# Patient Record
Sex: FEMALE | Race: WHITE | NOT HISPANIC OR LATINO | URBAN - METROPOLITAN AREA
[De-identification: names, ages, dates, MRNs, and addresses within clinical notes are randomized per-mention and may not be internally consistent; named-entity substitution may affect disease eponyms.]

---

## 2022-03-15 ENCOUNTER — INPATIENT (INPATIENT)
Facility: HOSPITAL | Age: 47
LOS: 2 days | Discharge: HOME | End: 2022-03-18
Attending: SPECIALIST | Admitting: SPECIALIST
Payer: MEDICARE

## 2022-03-15 VITALS
DIASTOLIC BLOOD PRESSURE: 82 MMHG | HEIGHT: 63 IN | RESPIRATION RATE: 18 BRPM | SYSTOLIC BLOOD PRESSURE: 143 MMHG | HEART RATE: 90 BPM | WEIGHT: 145.95 LBS | TEMPERATURE: 99 F | OXYGEN SATURATION: 99 %

## 2022-03-15 DIAGNOSIS — Z98.890 OTHER SPECIFIED POSTPROCEDURAL STATES: Chronic | ICD-10-CM

## 2022-03-15 LAB
ALBUMIN SERPL ELPH-MCNC: 4.7 G/DL — SIGNIFICANT CHANGE UP (ref 3.5–5.2)
ALP SERPL-CCNC: 85 U/L — SIGNIFICANT CHANGE UP (ref 30–115)
ALT FLD-CCNC: 20 U/L — SIGNIFICANT CHANGE UP (ref 0–41)
ANION GAP SERPL CALC-SCNC: 12 MMOL/L — SIGNIFICANT CHANGE UP (ref 7–14)
APPEARANCE UR: ABNORMAL
AST SERPL-CCNC: 17 U/L — SIGNIFICANT CHANGE UP (ref 0–41)
BACTERIA # UR AUTO: ABNORMAL
BASOPHILS # BLD AUTO: 0.08 K/UL — SIGNIFICANT CHANGE UP (ref 0–0.2)
BASOPHILS NFR BLD AUTO: 0.4 % — SIGNIFICANT CHANGE UP (ref 0–1)
BILIRUB SERPL-MCNC: 0.3 MG/DL — SIGNIFICANT CHANGE UP (ref 0.2–1.2)
BILIRUB UR-MCNC: NEGATIVE — SIGNIFICANT CHANGE UP
BUN SERPL-MCNC: 18 MG/DL — SIGNIFICANT CHANGE UP (ref 10–20)
CALCIUM SERPL-MCNC: 10 MG/DL — SIGNIFICANT CHANGE UP (ref 8.5–10.1)
CHLORIDE SERPL-SCNC: 101 MMOL/L — SIGNIFICANT CHANGE UP (ref 98–110)
CO2 SERPL-SCNC: 27 MMOL/L — SIGNIFICANT CHANGE UP (ref 17–32)
COLOR SPEC: YELLOW — SIGNIFICANT CHANGE UP
CREAT SERPL-MCNC: 1.1 MG/DL — SIGNIFICANT CHANGE UP (ref 0.7–1.5)
DIFF PNL FLD: ABNORMAL
EGFR: 63 ML/MIN/1.73M2 — SIGNIFICANT CHANGE UP
EOSINOPHIL # BLD AUTO: 0.05 K/UL — SIGNIFICANT CHANGE UP (ref 0–0.7)
EOSINOPHIL NFR BLD AUTO: 0.3 % — SIGNIFICANT CHANGE UP (ref 0–8)
GLUCOSE SERPL-MCNC: 99 MG/DL — SIGNIFICANT CHANGE UP (ref 70–99)
GLUCOSE UR QL: NEGATIVE MG/DL — SIGNIFICANT CHANGE UP
HCG UR QL: NEGATIVE — SIGNIFICANT CHANGE UP
HCT VFR BLD CALC: 38.8 % — SIGNIFICANT CHANGE UP (ref 37–47)
HGB BLD-MCNC: 13.2 G/DL — SIGNIFICANT CHANGE UP (ref 12–16)
IMM GRANULOCYTES NFR BLD AUTO: 1.1 % — HIGH (ref 0.1–0.3)
KETONES UR-MCNC: NEGATIVE — SIGNIFICANT CHANGE UP
LACTATE SERPL-SCNC: 1.1 MMOL/L — SIGNIFICANT CHANGE UP (ref 0.7–2)
LEUKOCYTE ESTERASE UR-ACNC: ABNORMAL
LIDOCAIN IGE QN: 97 U/L — HIGH (ref 7–60)
LYMPHOCYTES # BLD AUTO: 1.29 K/UL — SIGNIFICANT CHANGE UP (ref 1.2–3.4)
LYMPHOCYTES # BLD AUTO: 6.5 % — LOW (ref 20.5–51.1)
MCHC RBC-ENTMCNC: 29.7 PG — SIGNIFICANT CHANGE UP (ref 27–31)
MCHC RBC-ENTMCNC: 34 G/DL — SIGNIFICANT CHANGE UP (ref 32–37)
MCV RBC AUTO: 87.4 FL — SIGNIFICANT CHANGE UP (ref 81–99)
MONOCYTES # BLD AUTO: 0.96 K/UL — HIGH (ref 0.1–0.6)
MONOCYTES NFR BLD AUTO: 4.8 % — SIGNIFICANT CHANGE UP (ref 1.7–9.3)
NEUTROPHILS # BLD AUTO: 17.25 K/UL — HIGH (ref 1.4–6.5)
NEUTROPHILS NFR BLD AUTO: 86.9 % — HIGH (ref 42.2–75.2)
NITRITE UR-MCNC: POSITIVE
NRBC # BLD: 0 /100 WBCS — SIGNIFICANT CHANGE UP (ref 0–0)
PH UR: 7 — SIGNIFICANT CHANGE UP (ref 5–8)
PLATELET # BLD AUTO: 324 K/UL — SIGNIFICANT CHANGE UP (ref 130–400)
POTASSIUM SERPL-MCNC: 4.6 MMOL/L — SIGNIFICANT CHANGE UP (ref 3.5–5)
POTASSIUM SERPL-SCNC: 4.6 MMOL/L — SIGNIFICANT CHANGE UP (ref 3.5–5)
PROT SERPL-MCNC: 7.1 G/DL — SIGNIFICANT CHANGE UP (ref 6–8)
PROT UR-MCNC: >=300 MG/DL
RBC # BLD: 4.44 M/UL — SIGNIFICANT CHANGE UP (ref 4.2–5.4)
RBC # FLD: 12.1 % — SIGNIFICANT CHANGE UP (ref 11.5–14.5)
RBC CASTS # UR COMP ASSIST: >50 /HPF
SARS-COV-2 RNA SPEC QL NAA+PROBE: SIGNIFICANT CHANGE UP
SODIUM SERPL-SCNC: 140 MMOL/L — SIGNIFICANT CHANGE UP (ref 135–146)
SP GR SPEC: >=1.03 (ref 1.01–1.03)
UROBILINOGEN FLD QL: 0.2 MG/DL — SIGNIFICANT CHANGE UP
WBC # BLD: 19.84 K/UL — HIGH (ref 4.8–10.8)
WBC # FLD AUTO: 19.84 K/UL — HIGH (ref 4.8–10.8)
WBC UR QL: >50 /HPF

## 2022-03-15 PROCEDURE — 99285 EMERGENCY DEPT VISIT HI MDM: CPT | Mod: FS

## 2022-03-15 PROCEDURE — 74177 CT ABD & PELVIS W/CONTRAST: CPT | Mod: 26,MA

## 2022-03-15 PROCEDURE — 99221 1ST HOSP IP/OBS SF/LOW 40: CPT

## 2022-03-15 RX ORDER — ZINC SULFATE TAB 220 MG (50 MG ZINC EQUIVALENT) 220 (50 ZN) MG
0 TAB ORAL
Qty: 0 | Refills: 0 | DISCHARGE

## 2022-03-15 RX ORDER — CLOPIDOGREL BISULFATE 75 MG/1
75 TABLET, FILM COATED ORAL DAILY
Refills: 0 | Status: DISCONTINUED | OUTPATIENT
Start: 2022-03-15 | End: 2022-03-15

## 2022-03-15 RX ORDER — L.ACIDOPH/B.ANIMALIS/B.LONGUM 15B CELL
1 CAPSULE ORAL
Qty: 0 | Refills: 0 | DISCHARGE

## 2022-03-15 RX ORDER — ASPIRIN/CALCIUM CARB/MAGNESIUM 324 MG
0 TABLET ORAL
Qty: 0 | Refills: 0 | DISCHARGE

## 2022-03-15 RX ORDER — CLOPIDOGREL BISULFATE 75 MG/1
1 TABLET, FILM COATED ORAL
Qty: 0 | Refills: 0 | DISCHARGE

## 2022-03-15 RX ORDER — PHENAZOPYRIDINE HCL 100 MG
100 TABLET ORAL ONCE
Refills: 0 | Status: COMPLETED | OUTPATIENT
Start: 2022-03-15 | End: 2022-03-15

## 2022-03-15 RX ORDER — CEFEPIME 1 G/1
1000 INJECTION, POWDER, FOR SOLUTION INTRAMUSCULAR; INTRAVENOUS ONCE
Refills: 0 | Status: COMPLETED | OUTPATIENT
Start: 2022-03-15 | End: 2022-03-15

## 2022-03-15 RX ORDER — CEFTRIAXONE 500 MG/1
1000 INJECTION, POWDER, FOR SOLUTION INTRAMUSCULAR; INTRAVENOUS EVERY 24 HOURS
Refills: 0 | Status: DISCONTINUED | OUTPATIENT
Start: 2022-03-15 | End: 2022-03-15

## 2022-03-15 RX ORDER — CEFEPIME 1 G/1
1000 INJECTION, POWDER, FOR SOLUTION INTRAMUSCULAR; INTRAVENOUS EVERY 8 HOURS
Refills: 0 | Status: DISCONTINUED | OUTPATIENT
Start: 2022-03-15 | End: 2022-03-16

## 2022-03-15 RX ORDER — SODIUM CHLORIDE 9 MG/ML
1000 INJECTION INTRAMUSCULAR; INTRAVENOUS; SUBCUTANEOUS
Refills: 0 | Status: DISCONTINUED | OUTPATIENT
Start: 2022-03-15 | End: 2022-03-16

## 2022-03-15 RX ORDER — CHLORHEXIDINE GLUCONATE 213 G/1000ML
1 SOLUTION TOPICAL
Refills: 0 | Status: DISCONTINUED | OUTPATIENT
Start: 2022-03-15 | End: 2022-03-15

## 2022-03-15 RX ORDER — HYDROMORPHONE HYDROCHLORIDE 2 MG/ML
0.5 INJECTION INTRAMUSCULAR; INTRAVENOUS; SUBCUTANEOUS
Refills: 0 | Status: DISCONTINUED | OUTPATIENT
Start: 2022-03-15 | End: 2022-03-16

## 2022-03-15 RX ORDER — SODIUM CHLORIDE 9 MG/ML
1000 INJECTION, SOLUTION INTRAVENOUS
Refills: 0 | Status: DISCONTINUED | OUTPATIENT
Start: 2022-03-15 | End: 2022-03-16

## 2022-03-15 RX ORDER — SIMVASTATIN 20 MG/1
20 TABLET, FILM COATED ORAL AT BEDTIME
Refills: 0 | Status: DISCONTINUED | OUTPATIENT
Start: 2022-03-15 | End: 2022-03-15

## 2022-03-15 RX ORDER — ASPIRIN/CALCIUM CARB/MAGNESIUM 324 MG
325 TABLET ORAL DAILY
Refills: 0 | Status: DISCONTINUED | OUTPATIENT
Start: 2022-03-15 | End: 2022-03-15

## 2022-03-15 RX ORDER — MORPHINE SULFATE 50 MG/1
2 CAPSULE, EXTENDED RELEASE ORAL EVERY 4 HOURS
Refills: 0 | Status: DISCONTINUED | OUTPATIENT
Start: 2022-03-15 | End: 2022-03-16

## 2022-03-15 RX ORDER — SODIUM CHLORIDE 9 MG/ML
1000 INJECTION, SOLUTION INTRAVENOUS ONCE
Refills: 0 | Status: COMPLETED | OUTPATIENT
Start: 2022-03-15 | End: 2022-03-15

## 2022-03-15 RX ORDER — SODIUM CHLORIDE 9 MG/ML
1000 INJECTION, SOLUTION INTRAVENOUS
Refills: 0 | Status: DISCONTINUED | OUTPATIENT
Start: 2022-03-15 | End: 2022-03-15

## 2022-03-15 RX ORDER — SIMVASTATIN 20 MG/1
1 TABLET, FILM COATED ORAL
Qty: 0 | Refills: 0 | DISCHARGE

## 2022-03-15 RX ADMIN — SODIUM CHLORIDE 1000 MILLILITER(S): 9 INJECTION, SOLUTION INTRAVENOUS at 16:18

## 2022-03-15 RX ADMIN — Medication 100 MILLIGRAM(S): at 22:44

## 2022-03-15 RX ADMIN — CEFEPIME 100 MILLIGRAM(S): 1 INJECTION, POWDER, FOR SOLUTION INTRAMUSCULAR; INTRAVENOUS at 17:45

## 2022-03-15 NOTE — ED ADULT NURSE NOTE - NSIMPLEMENTINTERV_GEN_ALL_ED
No Implemented All Universal Safety Interventions:  [place] to call system. Call bell, personal items and telephone within reach. Instruct patient to call for assistance. Room bathroom lighting operational. Non-slip footwear when patient is off stretcher. Physically safe environment: no spills, clutter or unnecessary equipment. Stretcher in lowest position, wheels locked, appropriate side rails in place.

## 2022-03-15 NOTE — PRE-ANESTHESIA EVALUATION ADULT - NSANTHPMHFT_GEN_ALL_CORE
47 YO f WITH PMHX OF CVA 13 years ago due to OCP'S required crainotomy on ASA/ PLAVIX/ HLD on statin , p/w left flank pain x 1 day 10/10 sharp non-radiating.    # LEFT RENAL COLIC WITH ELEVATED WBC AND LOW GRADE TEMP   0.7 x 0.5 x 0.9 cm   distal left ureteral calculus

## 2022-03-15 NOTE — H&P ADULT - NSHPPHYSICALEXAM_GEN_ALL_CORE
Vital Signs Last 24 Hrs  T(C): 37.5 (15 Mar 2022 17:52), Max: 37.5 (15 Mar 2022 17:52)  T(F): 99.5 (15 Mar 2022 17:52), Max: 99.5 (15 Mar 2022 17:52)  HR: 80 (15 Mar 2022 17:52) (80 - 90)  BP: 134/80 (15 Mar 2022 17:52) (134/80 - 143/82)  RR: 18 (15 Mar 2022 17:52) (18 - 18)  SpO2: 99% (15 Mar 2022 17:52) (99% - 99%)      GENERAL:  45y/o Female NAD, resting comfortably.  HEAD:  Atraumatic, right craniotomy   EYES: EOMI, PERRLA, conjunctiva and sclera clear  NECK: Supple, No JVD, no cervical lymphadenopathy, non-tender  CHEST/LUNG: Clear to auscultation bilaterally; No wheeze, rhonchi, or rales  HEART: Regular rate and rhythm; S1&S2  ABDOMEN: Soft, Nontender, Nondistended x 4 quadrants; Bowel sounds present + CVAT left side  EXTREMITIES:   Peripheral Pulses Present, No clubbing, no cyanosis, or no edema, no calf tenderness, LUE weakness drom   PSYCH: AAOx3, cooperative, appropriate  NEUROLOGY: WNL

## 2022-03-15 NOTE — ED PROVIDER NOTE - NS ED ATTENDING STATEMENT MOD
This was a shared visit with the MERON. I reviewed and verified the documentation and independently performed the documented:

## 2022-03-15 NOTE — H&P ADULT - HISTORY OF PRESENT ILLNESS
47 YO f WITH PMHX OF CVA 13 years ago due to OCP'S required crainotomy on ASA/ PLAVIX/ HLD on statin , p/w left flank pain x 1 day 10/10 sharp non-radiating.  pt states visiting PMD- dr. jones last week and being treated for UTI but pain arised today and was sent to ER to eval for stone.  Pt vomited in PMD office. + fever in ER 99.5   denies sob/ cp / chills / rigors   denies frequent UTI   does not have a urologist     ER: IVF / IVABX

## 2022-03-15 NOTE — H&P ADULT - ASSESSMENT
45 YO f WITH PMHX OF CVA 13 years ago due to OCP'S required crainotomy on ASA/ PLAVIX/ HLD on statin , p/w left flank pain x 1 day 10/10 sharp non-radiating.    # LEFT RENAL COLIC WITH ELEVATED WBC AND LOW GRADE TEMP   0.7 x 0.5 x 0.9 cm   distal left ureteral calculus    - NPO   - OR FOR STENT PLACEMENT   - IV ABX   - PAIN MEDS   - IVF   - LABS IN AM     D/W DR. NUNES

## 2022-03-15 NOTE — ED PROVIDER NOTE - ATTENDING CONTRIBUTION TO CARE
46y female above PMH with persistent dysuria now with left flank pain and subjective fevers, on exam vital signs appreciated, well appearing, abd snt, +left cvat, labs and studies reviewed, will admit to urology for iv abx, intervention, stable for floor

## 2022-03-15 NOTE — ED PROVIDER NOTE - NS ED ROS FT
CONST: No fever, chills or bodyaches  EYES: No pain, redness, drainage or visual changes.  ENT: No ear pain or discharge, nasal discharge or congestion. No sore throat  CARD: No chest pain, palpitations  RESP: No SOB, cough, hemoptysis. No hx of asthma or COPD  GI: No abdominal pain, N/V/D  : (+) dysuria, urinary frequency, and left flank pain.   MS: No joint pain, back pain or extremity pain/injury  SKIN: No rashes  NEURO: No headache, dizziness, paresthesias or LOC

## 2022-03-15 NOTE — ED ADULT TRIAGE NOTE - CHIEF COMPLAINT QUOTE
Pt states she just finished a course of abx for a UTI, went in to see Dr. Telles today because she was still in pain. Vomited in office, Koki sent pt in to r/o kidney stone.

## 2022-03-15 NOTE — ED PROVIDER NOTE - PHYSICAL EXAMINATION
Physical Exam    Vital Signs: I have reviewed the initial vital signs.  Constitutional: well-nourished, appears stated age, no acute distress  Eyes: Conjunctiva pink, Sclera clear  Cardiovascular: S1 and S2, regular rate, regular rhythm, well-perfused extremities, radial pulses equal and 2+ b/l.   Respiratory: unlabored respiratory effort, clear to auscultation bilaterally no wheezing, rales and rhonchi. pt is speaking full sentences. no accessory muscle use.   Gastrointestinal: soft, non-tender, nondistended abdomen, no pulsatile mass, normal bowl sounds, no rebound, no guarding, no cva tenderness.    Musculoskeletal: supple neck, no lower extremity edema, no calf tenderness. (+) left arm is contracted as residual from past cva.   Integumentary: warm, dry, no rash  Neurologic: (+) right frontal scalp deformity due to past craniotomy. awake, alert, cranial nerves II-XII grossly intact, extremities’ motor and sensory functions grossly intact.   Psychiatric: appropriate mood, appropriate affect

## 2022-03-15 NOTE — H&P ADULT - NSHPLABSRESULTS_GEN_ALL_CORE
03-15    140  |  101  |  18  ----------------------------<  99  4.6   |  27  |  1.1    Ca    10.0      15 Mar 2022 16:20    TPro  7.1  /  Alb  4.7  /  TBili  0.3  /  DBili  x   /  AST  17  /  ALT  20  /  AlkPhos  85  03-15                            13.2   19.84 )-----------( 324      ( 15 Mar 2022 16:20 )             38.8     < from: CT Abdomen and Pelvis w/ IV Cont (03.15.22 @ 17:21) >    IMPRESSION:  1.  Moderate left hydroureteronephrosis secondary to a 0.7 x 0.5 x 0.9 cm   distal left ureteral calculus (); diffuse left-sided urothelial   thickening/hyperenhancement and surrounding inflammation. Findings may   represent superimposed ascending urinary tract infection. Correlate with   urinalysis and other laboratory findings.    2.  Right UPJ/proximal ureteral 0.8 x 0.5 x 0.9 cm calculus without   associated hydronephrosis.    < end of copied text >

## 2022-03-15 NOTE — PROGRESS NOTE ADULT - SUBJECTIVE AND OBJECTIVE BOX
I have been notified by the Interventional Radiology team that the pt will have Left PCN performed first case in morning.  Dr Saul aware.  As a result we are requesting admission to the ICU for sepsis and to have close hemodynamic monitoring.   The SICU Attending has approved the admission and pt will be transfered directly from the [facility] PACU to the SICU. In the event that pt becomes unstable the IR team will emergently perform the procedure

## 2022-03-15 NOTE — H&P ADULT - NSHPADDITIONALINFOADULT_GEN_ALL_CORE
I offered the patient cystoscopy bilateral ureteral stent placement.  Side effects were discussed.  She will need definitive treatment of kidney stones after UTI is resolved.  Risk of bleeding, infection, urgency, incontinence were discussed.

## 2022-03-15 NOTE — BRIEF OPERATIVE NOTE - OPERATION/FINDINGS
Cystoscopy Right Ureteral Stent placement (pyonephrosis seen)  Cystoscopy Attempted left Ureteral Stent Placement (significant pyonephrosis seen upon placement of guidewire past the stone.  I could not advance the guidewire in the renal pelvis however.

## 2022-03-15 NOTE — ED ADULT NURSE REASSESSMENT NOTE - NS ED NURSE REASSESS COMMENT FT1
pt received from previous rn, pt aaox3, resps even and unlabored. report given to OR. pt undressed, belongings given to patients . pre op checklist started pending transport to OR

## 2022-03-15 NOTE — ED PROVIDER NOTE - OBJECTIVE STATEMENT
45 y/o female with a PMH of CVA (induced by birth control about 8 years ago, s/p right craniotomy, on plavix, aspirin, and statin) presents to the ED for evaluation of left flank pain that began earlier today. pt reports dysuria, and urinary frequency. pt reports she was seen by her PCP Dr. Telles after finishing course of bactrim, and macrobid and was advised to visit the ED for further evaluation. pt mother has had multiple kidney stones. pt is currently on her menses. pt reports one episode of nbnb emesis today. pt denies hx of kidney stones, rashes, chest pain, sob, recent trauma, weakness, numbness, tingling, hx of abdominal surgeries.

## 2022-03-16 LAB
A1C WITH ESTIMATED AVERAGE GLUCOSE RESULT: 5 % — SIGNIFICANT CHANGE UP (ref 4–5.6)
ANION GAP SERPL CALC-SCNC: 10 MMOL/L — SIGNIFICANT CHANGE UP (ref 7–14)
ANION GAP SERPL CALC-SCNC: 11 MMOL/L — SIGNIFICANT CHANGE UP (ref 7–14)
APTT BLD: 31 SEC — SIGNIFICANT CHANGE UP (ref 27–39.2)
BUN SERPL-MCNC: 12 MG/DL — SIGNIFICANT CHANGE UP (ref 10–20)
BUN SERPL-MCNC: 13 MG/DL — SIGNIFICANT CHANGE UP (ref 10–20)
CALCIUM SERPL-MCNC: 8.5 MG/DL — SIGNIFICANT CHANGE UP (ref 8.5–10.1)
CALCIUM SERPL-MCNC: 8.7 MG/DL — SIGNIFICANT CHANGE UP (ref 8.5–10.1)
CHLORIDE SERPL-SCNC: 101 MMOL/L — SIGNIFICANT CHANGE UP (ref 98–110)
CHLORIDE SERPL-SCNC: 101 MMOL/L — SIGNIFICANT CHANGE UP (ref 98–110)
CO2 SERPL-SCNC: 25 MMOL/L — SIGNIFICANT CHANGE UP (ref 17–32)
CO2 SERPL-SCNC: 25 MMOL/L — SIGNIFICANT CHANGE UP (ref 17–32)
CREAT SERPL-MCNC: 0.9 MG/DL — SIGNIFICANT CHANGE UP (ref 0.7–1.5)
CREAT SERPL-MCNC: 1.1 MG/DL — SIGNIFICANT CHANGE UP (ref 0.7–1.5)
E COLI DNA BLD POS QL NAA+NON-PROBE: SIGNIFICANT CHANGE UP
EGFR: 63 ML/MIN/1.73M2 — SIGNIFICANT CHANGE UP
EGFR: 80 ML/MIN/1.73M2 — SIGNIFICANT CHANGE UP
ESTIMATED AVERAGE GLUCOSE: 97 MG/DL — SIGNIFICANT CHANGE UP (ref 68–114)
GLUCOSE SERPL-MCNC: 115 MG/DL — HIGH (ref 70–99)
GLUCOSE SERPL-MCNC: 153 MG/DL — HIGH (ref 70–99)
GRAM STN FLD: SIGNIFICANT CHANGE UP
GRAM STN FLD: SIGNIFICANT CHANGE UP
HCT VFR BLD CALC: 34.2 % — LOW (ref 37–47)
HCT VFR BLD CALC: 35.6 % — LOW (ref 37–47)
HGB BLD-MCNC: 11.2 G/DL — LOW (ref 12–16)
HGB BLD-MCNC: 12 G/DL — SIGNIFICANT CHANGE UP (ref 12–16)
INR BLD: 1.16 RATIO — SIGNIFICANT CHANGE UP (ref 0.65–1.3)
MAGNESIUM SERPL-MCNC: 1.8 MG/DL — SIGNIFICANT CHANGE UP (ref 1.8–2.4)
MAGNESIUM SERPL-MCNC: 2 MG/DL — SIGNIFICANT CHANGE UP (ref 1.8–2.4)
MCHC RBC-ENTMCNC: 29.5 PG — SIGNIFICANT CHANGE UP (ref 27–31)
MCHC RBC-ENTMCNC: 29.6 PG — SIGNIFICANT CHANGE UP (ref 27–31)
MCHC RBC-ENTMCNC: 32.7 G/DL — SIGNIFICANT CHANGE UP (ref 32–37)
MCHC RBC-ENTMCNC: 33.7 G/DL — SIGNIFICANT CHANGE UP (ref 32–37)
MCV RBC AUTO: 87.9 FL — SIGNIFICANT CHANGE UP (ref 81–99)
MCV RBC AUTO: 90 FL — SIGNIFICANT CHANGE UP (ref 81–99)
METHOD TYPE: SIGNIFICANT CHANGE UP
NRBC # BLD: 0 /100 WBCS — SIGNIFICANT CHANGE UP (ref 0–0)
NRBC # BLD: 0 /100 WBCS — SIGNIFICANT CHANGE UP (ref 0–0)
PHOSPHATE SERPL-MCNC: 2.6 MG/DL — SIGNIFICANT CHANGE UP (ref 2.1–4.9)
PHOSPHATE SERPL-MCNC: 3.6 MG/DL — SIGNIFICANT CHANGE UP (ref 2.1–4.9)
PLATELET # BLD AUTO: 302 K/UL — SIGNIFICANT CHANGE UP (ref 130–400)
PLATELET # BLD AUTO: 334 K/UL — SIGNIFICANT CHANGE UP (ref 130–400)
POTASSIUM SERPL-MCNC: 4.5 MMOL/L — SIGNIFICANT CHANGE UP (ref 3.5–5)
POTASSIUM SERPL-MCNC: 4.8 MMOL/L — SIGNIFICANT CHANGE UP (ref 3.5–5)
POTASSIUM SERPL-SCNC: 4.5 MMOL/L — SIGNIFICANT CHANGE UP (ref 3.5–5)
POTASSIUM SERPL-SCNC: 4.8 MMOL/L — SIGNIFICANT CHANGE UP (ref 3.5–5)
PROTHROM AB SERPL-ACNC: 13.3 SEC — HIGH (ref 9.95–12.87)
RBC # BLD: 3.8 M/UL — LOW (ref 4.2–5.4)
RBC # BLD: 4.05 M/UL — LOW (ref 4.2–5.4)
RBC # FLD: 12.3 % — SIGNIFICANT CHANGE UP (ref 11.5–14.5)
RBC # FLD: 12.7 % — SIGNIFICANT CHANGE UP (ref 11.5–14.5)
SODIUM SERPL-SCNC: 136 MMOL/L — SIGNIFICANT CHANGE UP (ref 135–146)
SODIUM SERPL-SCNC: 137 MMOL/L — SIGNIFICANT CHANGE UP (ref 135–146)
SPECIMEN SOURCE: SIGNIFICANT CHANGE UP
SPECIMEN SOURCE: SIGNIFICANT CHANGE UP
WBC # BLD: 21.7 K/UL — HIGH (ref 4.8–10.8)
WBC # BLD: 25.78 K/UL — HIGH (ref 4.8–10.8)
WBC # FLD AUTO: 21.7 K/UL — HIGH (ref 4.8–10.8)
WBC # FLD AUTO: 25.78 K/UL — HIGH (ref 4.8–10.8)

## 2022-03-16 PROCEDURE — 99291 CRITICAL CARE FIRST HOUR: CPT

## 2022-03-16 PROCEDURE — 50432 PLMT NEPHROSTOMY CATHETER: CPT | Mod: LT

## 2022-03-16 PROCEDURE — 93010 ELECTROCARDIOGRAM REPORT: CPT

## 2022-03-16 RX ORDER — SIMVASTATIN 20 MG/1
20 TABLET, FILM COATED ORAL AT BEDTIME
Refills: 0 | Status: DISCONTINUED | OUTPATIENT
Start: 2022-03-16 | End: 2022-03-18

## 2022-03-16 RX ORDER — INFLUENZA VIRUS VACCINE 15; 15; 15; 15 UG/.5ML; UG/.5ML; UG/.5ML; UG/.5ML
0.5 SUSPENSION INTRAMUSCULAR ONCE
Refills: 0 | Status: DISCONTINUED | OUTPATIENT
Start: 2022-03-16 | End: 2022-03-18

## 2022-03-16 RX ORDER — CEFTRIAXONE 500 MG/1
2000 INJECTION, POWDER, FOR SOLUTION INTRAMUSCULAR; INTRAVENOUS EVERY 12 HOURS
Refills: 0 | Status: DISCONTINUED | OUTPATIENT
Start: 2022-03-16 | End: 2022-03-18

## 2022-03-16 RX ORDER — SENNA PLUS 8.6 MG/1
2 TABLET ORAL AT BEDTIME
Refills: 0 | Status: DISCONTINUED | OUTPATIENT
Start: 2022-03-16 | End: 2022-03-18

## 2022-03-16 RX ORDER — SODIUM CHLORIDE 9 MG/ML
1000 INJECTION, SOLUTION INTRAVENOUS
Refills: 0 | Status: DISCONTINUED | OUTPATIENT
Start: 2022-03-16 | End: 2022-03-18

## 2022-03-16 RX ORDER — PANTOPRAZOLE SODIUM 20 MG/1
40 TABLET, DELAYED RELEASE ORAL
Refills: 0 | Status: DISCONTINUED | OUTPATIENT
Start: 2022-03-16 | End: 2022-03-18

## 2022-03-16 RX ORDER — ENOXAPARIN SODIUM 100 MG/ML
40 INJECTION SUBCUTANEOUS EVERY 24 HOURS
Refills: 0 | Status: DISCONTINUED | OUTPATIENT
Start: 2022-03-16 | End: 2022-03-18

## 2022-03-16 RX ORDER — OXYCODONE HYDROCHLORIDE 5 MG/1
5 TABLET ORAL EVERY 6 HOURS
Refills: 0 | Status: DISCONTINUED | OUTPATIENT
Start: 2022-03-16 | End: 2022-03-18

## 2022-03-16 RX ORDER — ACETAMINOPHEN 500 MG
650 TABLET ORAL EVERY 6 HOURS
Refills: 0 | Status: DISCONTINUED | OUTPATIENT
Start: 2022-03-16 | End: 2022-03-18

## 2022-03-16 RX ADMIN — OXYCODONE HYDROCHLORIDE 5 MILLIGRAM(S): 5 TABLET ORAL at 12:00

## 2022-03-16 RX ADMIN — CEFEPIME 100 MILLIGRAM(S): 1 INJECTION, POWDER, FOR SOLUTION INTRAMUSCULAR; INTRAVENOUS at 05:19

## 2022-03-16 RX ADMIN — Medication 650 MILLIGRAM(S): at 21:53

## 2022-03-16 RX ADMIN — OXYCODONE HYDROCHLORIDE 5 MILLIGRAM(S): 5 TABLET ORAL at 17:27

## 2022-03-16 RX ADMIN — OXYCODONE HYDROCHLORIDE 5 MILLIGRAM(S): 5 TABLET ORAL at 11:24

## 2022-03-16 RX ADMIN — Medication 650 MILLIGRAM(S): at 03:47

## 2022-03-16 RX ADMIN — OXYCODONE HYDROCHLORIDE 5 MILLIGRAM(S): 5 TABLET ORAL at 04:17

## 2022-03-16 RX ADMIN — SENNA PLUS 2 TABLET(S): 8.6 TABLET ORAL at 21:53

## 2022-03-16 RX ADMIN — ENOXAPARIN SODIUM 40 MILLIGRAM(S): 100 INJECTION SUBCUTANEOUS at 15:44

## 2022-03-16 RX ADMIN — HYDROMORPHONE HYDROCHLORIDE 0.5 MILLIGRAM(S): 2 INJECTION INTRAMUSCULAR; INTRAVENOUS; SUBCUTANEOUS at 01:20

## 2022-03-16 RX ADMIN — Medication 650 MILLIGRAM(S): at 22:59

## 2022-03-16 RX ADMIN — CEFTRIAXONE 100 MILLIGRAM(S): 500 INJECTION, POWDER, FOR SOLUTION INTRAMUSCULAR; INTRAVENOUS at 17:29

## 2022-03-16 RX ADMIN — SIMVASTATIN 20 MILLIGRAM(S): 20 TABLET, FILM COATED ORAL at 21:52

## 2022-03-16 NOTE — PROGRESS NOTE ADULT - SUBJECTIVE AND OBJECTIVE BOX
UROLOGY POST-OP CHECK    Pt is a 46y Female admitted with right UPJ stone & left distal ureteral stone w. moderate hydro. Patient seen and examined s/p cystoscopy with right ureteral stent placement and attempted left ureteral stent placement. Unable to pass left stent, patient transferred from [facility] for left PCN. Patient reports mild suprapubic discomfort, improved with pain medications. Reports urinating without issue after the procedure, states that urine was blood-tinged at the time. Denies fever, chills, chest pain, SOB, nausea, vomiting.    MEDICATIONS  (STANDING):  cefepime   IVPB 1000 milliGRAM(s) IV Intermittent every 8 hours  influenza   Vaccine 0.5 milliLiter(s) IntraMuscular once  lactated ringers. 1000 milliLiter(s) (100 mL/Hr) IV Continuous <Continuous>  pantoprazole    Tablet 40 milliGRAM(s) Oral before breakfast  senna 2 Tablet(s) Oral at bedtime    MEDICATIONS  (PRN):  acetaminophen     Tablet .. 650 milliGRAM(s) Oral every 6 hours PRN Mild Pain (1 - 3)  HYDROmorphone  Injectable 0.5 milliGRAM(s) IV Push every 10 minutes PRN Moderate Pain (4 - 6)    REVIEW OF SYSTEMS   [x] A ten-point review of systems was otherwise negative except as noted.    Vital Signs Last 24 Hrs  T(C): 36.7 (16 Mar 2022 01:00), Max: 37.8 (15 Mar 2022 19:18)  T(F): 98 (16 Mar 2022 01:00), Max: 100.1 (15 Mar 2022 19:18)  HR: 81 (16 Mar 2022 01:00) (80 - 101)  BP: 131/79 (16 Mar 2022 01:00) (107/72 - 153/84)  RR: 17 (16 Mar 2022 01:00) (15 - 23)  SpO2: 98% (16 Mar 2022 01:00) (93% - 99%)    PHYSICAL EXAM:  GEN: NAD, well-developed, awake and alert.  SKIN: Good color, non diaphoretic.  HEENT: NC/AT.  RESP: No dyspnea, non-labored breathing. No use of accessory muscles.  CARDIO: +S1/S2  ABDO: Soft, nondistended. No palpable bladder, mild suprapubic tenderness to palpation, nontender to palpation over remaining abdomen.   BACK: No CVAT B/L  : Voiding freely.    I&O's Summary  15 Mar 2022 07:01  -  16 Mar 2022 01:16  --------------------------------------------------------  IN: 0 mL / OUT: 375 mL / NET: -375 mL    LABS:                     13.2   19.84 )-----------( 324      ( 15 Mar 2022 16:20 )             38.8     03-15    140  |  101  |  18  ----------------------------<  99  4.6   |  27  |  1.1    Ca    10.0      15 Mar 2022 16:20    TPro  7.1  /  Alb  4.7  /  TBili  0.3  /  DBili  x   /  AST  17  /  ALT  20  /  AlkPhos  85  03-15    Urinalysis Basic - ( 15 Mar 2022 16:20 )  Color: Yellow / Appearance: Cloudy / SG: >=1.030 / pH: x  Gluc: x / Ketone: Negative  / Bili: Negative / Urobili: 0.2 mg/dL   Blood: x / Protein: >=300 mg/dL / Nitrite: Positive   Leuk Esterase: Moderate / RBC: >50 /HPF / WBC >50 /HPF   Sq Epi: x / Non Sq Epi: x / Bacteria: Many      RADIOLOGY & ADDITIONAL STUDIES:  < from: CT Abdomen and Pelvis w/ IV Cont (03.15.22 @ 17:21) >  IMPRESSION:  1.  Moderate left hydroureteronephrosis secondary to a 0.7 x 0.5 x 0.9 cm   distal left ureteral calculus (); diffuse left-sided urothelial   thickening/hyperenhancement and surrounding inflammation. Findings may   represent superimposed ascending urinary tract infection. Correlate with   urinalysis and other laboratory findings.  2.  Right UPJ/proximal ureteral 0.8 x 0.5 x 0.9 cm calculus without   associated hydronephrosis.  < end of copied text >   UROLOGY POST-OP CHECK    Pt is a 46y Female admitted with right UPJ stone & left distal ureteral stone w. moderate hydro. Patient seen and examined s/p cystoscopy with right ureteral stent placement and attempted left ureteral stent placement. Unable to pass left stent, patient transferred from [facility] for left PCN. Patient reports mild suprapubic discomfort, improved with pain medications. Reports urinating without issue after the procedure, states that urine was blood-tinged at the time. Denies fever, chills, chest pain, SOB, nausea, vomiting.    MEDICATIONS  (STANDING):  cefepime   IVPB 1000 milliGRAM(s) IV Intermittent every 8 hours  influenza   Vaccine 0.5 milliLiter(s) IntraMuscular once  lactated ringers. 1000 milliLiter(s) (100 mL/Hr) IV Continuous <Continuous>  pantoprazole    Tablet 40 milliGRAM(s) Oral before breakfast  senna 2 Tablet(s) Oral at bedtime    MEDICATIONS  (PRN):  acetaminophen     Tablet .. 650 milliGRAM(s) Oral every 6 hours PRN Mild Pain (1 - 3)  HYDROmorphone  Injectable 0.5 milliGRAM(s) IV Push every 10 minutes PRN Moderate Pain (4 - 6)    REVIEW OF SYSTEMS   [x] A ten-point review of systems was otherwise negative except as noted.    Vital Signs Last 24 Hrs  T(C): 36.7 (16 Mar 2022 01:00), Max: 37.8 (15 Mar 2022 19:18)  T(F): 98 (16 Mar 2022 01:00), Max: 100.1 (15 Mar 2022 19:18)  HR: 81 (16 Mar 2022 01:00) (80 - 101)  BP: 131/79 (16 Mar 2022 01:00) (107/72 - 153/84)  RR: 17 (16 Mar 2022 01:00) (15 - 23)  SpO2: 98% (16 Mar 2022 01:00) (93% - 99%)    PHYSICAL EXAM:  GEN: NAD, well-developed, awake and alert.  SKIN: Good color, non diaphoretic.  RESP: No dyspnea, non-labored breathing. No use of accessory muscles.  CARDIO: +S1/S2  ABDO: Soft, nondistended. No palpable bladder, mild suprapubic tenderness to palpation, nontender to palpation over remaining abdomen.   BACK: No CVAT B/L  : Voiding freely.    I&O's Summary  15 Mar 2022 07:01  -  16 Mar 2022 01:16  --------------------------------------------------------  IN: 0 mL / OUT: 375 mL / NET: -375 mL    LABS:                     13.2   19.84 )-----------( 324      ( 15 Mar 2022 16:20 )             38.8     03-15    140  |  101  |  18  ----------------------------<  99  4.6   |  27  |  1.1    Ca    10.0      15 Mar 2022 16:20    TPro  7.1  /  Alb  4.7  /  TBili  0.3  /  DBili  x   /  AST  17  /  ALT  20  /  AlkPhos  85  03-15    Urinalysis Basic - ( 15 Mar 2022 16:20 )  Color: Yellow / Appearance: Cloudy / SG: >=1.030 / pH: x  Gluc: x / Ketone: Negative  / Bili: Negative / Urobili: 0.2 mg/dL   Blood: x / Protein: >=300 mg/dL / Nitrite: Positive   Leuk Esterase: Moderate / RBC: >50 /HPF / WBC >50 /HPF   Sq Epi: x / Non Sq Epi: x / Bacteria: Many      RADIOLOGY & ADDITIONAL STUDIES:  < from: CT Abdomen and Pelvis w/ IV Cont (03.15.22 @ 17:21) >  IMPRESSION:  1.  Moderate left hydroureteronephrosis secondary to a 0.7 x 0.5 x 0.9 cm   distal left ureteral calculus (); diffuse left-sided urothelial   thickening/hyperenhancement and surrounding inflammation. Findings may   represent superimposed ascending urinary tract infection. Correlate with   urinalysis and other laboratory findings.  2.  Right UPJ/proximal ureteral 0.8 x 0.5 x 0.9 cm calculus without   associated hydronephrosis.  < end of copied text >

## 2022-03-16 NOTE — PROGRESS NOTE ADULT - SUBJECTIVE AND OBJECTIVE BOX
INTERVENTIONAL RADIOLOGY PROGRESS NOTE      46F with left septic stone and pyonephrosis.    O/N:  Transferred to SICU.      Vitals: T(F): 97.7 (03-16-22 @ 07:00), Max: 100.1 (03-15-22 @ 19:18)  HR: 62 (03-16-22 @ 08:01) (62 - 101)  BP: 118/71 (03-16-22 @ 08:01) (107/72 - 153/84)  RR: 17 (03-16-22 @ 08:01) (15 - 23)  SpO2: 97% (03-16-22 @ 08:01) (93% - 99%)  Wt(kg): --    LABS:                        12.0   21.70 )-----------( 302      ( 16 Mar 2022 05:50 )             35.6     03-16    136  |  101  |  12  ----------------------------<  153<H>  4.5   |  25  |  0.9    Ca    8.7      16 Mar 2022 05:50  Phos  3.6     03-16  Mg     1.8     03-16    TPro  7.1  /  Alb  4.7  /  TBili  0.3  /  DBili  x   /  AST  17  /  ALT  20  /  AlkPhos  85  03-15    PT/INR - ( 16 Mar 2022 05:50 )   PT: 13.30 sec;   INR: 1.16 ratio         PTT - ( 16 Mar 2022 05:50 )  PTT:31.0 sec    PHYSICAL EXAM:  GENERAL: Resting comfortably in bed. NAD  NEURO: Alert and oriented x3.  CARDIAC: Normal rate.  RESPIRATORY: Breathing comfortably on room air.  ABDOMEN: Soft, nontender.  EXTREMITIES: No peripheral edema.    ASSESSMENT AND PLAN:  46F with left septic ureteral calculus and pyonephrosis s/p ureteral stent placement attempt. Afebrile, hemodynamically stable, and clinically nontoxic this morning. The patient takes aspirin 325 and plavix for history of CVA in 2013 and last doses were yesterday morning. The risk of clinically significant hemorrhage is inherently high with percutaneous nephrostomy and further elevated due to the patient's antiplatelet therapy. Utility of repeat ureteral stenting attempt discussed between Dr. Santana and Dr. Saul and it was relayed that all urologic options have been exhausted. The bleeding risk was discussed with the patient and she amenable to proceed with the understanding that urgent intervention for this serious condition is ultimately required.    Please call Interventional Radiology with questions or concerns:   - M-F [phone]: [phone]   - All other hours: [phone]

## 2022-03-16 NOTE — PATIENT PROFILE ADULT - FALL HARM RISK - HARM RISK INTERVENTIONS

## 2022-03-16 NOTE — CHART NOTE - NSCHARTNOTEFT_GEN_A_CORE
I was not able to advance a ureteral stent into the left collecting system.  I explained my findings with the patient's .  I recommended Left Percutaneous Nephrostomy Tube Placement emergently. He agrees.  I spoke to the  PA (CATRACHO Hung) to transfer the patient to the [facility].  I also spoke to the Radiology resident on call for emergency IR consult for PCNT Left side (Dr. Lamar).  I explained the nature of the urgent consult and the need for drainage of the left kidney.  She said to transfer the patient to the [facility] and she will discuss with her attending.
PACU ANESTHESIA ADMISSION NOTE      Procedure:   Post op diagnosis:      ____  Intubated  TV:______       Rate: ______      FiO2: ______    __x__  Patent Airway    __x__  Full return of protective reflexes    __x__  Full recovery from anesthesia / back to baseline     Vitals:   T:  99.1         R:  18                BP:   153/84               Sat:     96              P:  106      Mental Status:  _x___ Awake   __x___ Alert   _____ Drowsy   _____ Sedated    Nausea/Vomiting:  _x___ NO  ______Yes,   See Post - Op Orders          Pain Scale (0-10):  ___0__    Treatment: ____ None    ____ See Post - Op/PCA Orders    Post - Operative Fluids:   ____ Oral   __x__ See Post - Op Orders    Plan: Discharge:   ____Home       __x___Floor     _____Critical Care    _____  Other:_________________    Comments:  pt to be transferred to [facility] for nephrostomy tube placement
SICU Transfer Note    Transfer from: SICU  Transfer to:  Med- Surg floor  Accepting team: Urology    SICU COURSE:  45 y/o female with h/o CVA requiring a right decompressive craniectomy years ago with residual left hand paralysis and left arm weakness who has a h/o kidney stones  and chronic UTI which was not improving with abx therapy.  Patient was refered to the ED by her PMD for w/u.  She was found to have b/l renal lithiasis- taken to the OR for attempted bilateral ureteral stents.  Right stent was successful, left was unable to be passed.  She has sever left hydro-uretero nephrosis.  Today she had IR placement of a left nephrostomy tube.  10 ml of urine was drained and sent for studies.  She tolerated procedure well.   She is afebrile, not tachycardic. her blood cultures are preliminary positive for gm negative rods.   Patient is awake and alert, conversant.       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  resume ASA/ Plavix at the discretion of the primary team    Pulm-  RA- no issues    Cards-  Normal hemodynamics  hyperlipidemia- resume Simvasttin 20 mg daily    GI  Advance to regular diet as tolerated  PPI  Senna    -  Voiding  s/p left nephrostomy for uretero-hydronephrosis  follow urine culture  LR at 100 ml/hr, IVL when po intake is adequate  Electrolytes at 2000    ID-  Gm negative leonard bacteremia  continue Cefepime  Follow cultures and ABX sensitivities    Heme  OK to start HSQ  Holding ASA/ Plavix at request of primary team- please resume as soon as possible from urologic stand point      Vital Signs Last 24 Hrs  T(C): 36.5 (16 Mar 2022 08:01), Max: 37.8 (15 Mar 2022 19:18)  T(F): 97.7 (16 Mar 2022 07:00), Max: 100.1 (15 Mar 2022 19:18)  HR: 76 (16 Mar 2022 10:42) (62 - 101)  BP: 127/63 (16 Mar 2022 10:42) (107/72 - 153/84)  BP(mean): 90 (16 Mar 2022 10:42) (88 - 90)  RR: 20 (16 Mar 2022 10:42) (15 - 23)  SpO2: 97% (16 Mar 2022 10:42) (93% - 99%)  I&O's Summary    15 Mar 2022 07:01  -  16 Mar 2022 07:00  --------------------------------------------------------  IN: 100 mL / OUT: 675 mL / NET: -575 mL    16 Mar 2022 07:01  -  16 Mar 2022 12:14  --------------------------------------------------------  IN: 500 mL / OUT: 200 mL / NET: 300 mL          MEDICATIONS  (STANDING):  cefepime   IVPB 1000 milliGRAM(s) IV Intermittent every 8 hours  influenza   Vaccine 0.5 milliLiter(s) IntraMuscular once  lactated ringers. 1000 milliLiter(s) (100 mL/Hr) IV Continuous <Continuous>  pantoprazole    Tablet 40 milliGRAM(s) Oral before breakfast  senna 2 Tablet(s) Oral at bedtime  simvastatin 20 milliGRAM(s) Oral at bedtime    MEDICATIONS  (PRN):  acetaminophen     Tablet .. 650 milliGRAM(s) Oral every 6 hours PRN Mild Pain (1 - 3)  oxyCODONE    IR 5 milliGRAM(s) Oral every 6 hours PRN Moderate Pain (4 - 6)        LABS                                            12.0                  Neurophils% (auto):   x      (03-16 @ 05:50):    21.70)-----------(302          Lymphocytes% (auto):  x                                             35.6                   Eosinphils% (auto):   x        Manual%: Neutrophils x    ; Lymphocytes x    ; Eosinophils x    ; Bands%: x    ; Blasts x                                    136    |  101    |  12                  Calcium: 8.7   / iCa: x      (03-16 @ 05:50)    ----------------------------<  153       Magnesium: 1.8                              4.5     |  25     |  0.9              Phosphorous: 3.6      TPro  7.1    /  Alb  4.7    /  TBili  0.3    /  DBili  x      /  AST  17     /  ALT  20     /  AlkPhos  85     15 Mar 2022 16:20    ( 03-16 @ 05:50 )   PT: 13.30 sec;   INR: 1.16 ratio  aPTT: 31.0 sec    Signed out to Huong HAYDEN at 1235pm on 3/16
47 y/o female with b/l kidney stone s/p cysto, right ureteral stent and failed left ureteral stent.   Dr. Saul discussed cased with IR for left nephrostomy tube.  A/P:  -Transfer order placed.  -[facility] PA aware at [id number]

## 2022-03-16 NOTE — PATIENT PROFILE ADULT - DO YOU FEEL LIKE HURTING YOURSELF OR OTHERS?
Patient reports to ED with complaints of left shoulder pain and back pain that began yesterday after she fell down while playing with her grandson. Patient took tylenol for the pain, but has not obtained any relief. no

## 2022-03-16 NOTE — PRE-ANESTHESIA EVALUATION ADULT - BP NONINVASIVE MEAN (MM HG)
[facility] 75  coding opportunities             Chart Reviewed * (Number of) Inbasket suggestions sent to Provider: 1                  Patients insurance company: [address]  (Medicare Advantage and Commercial)     Visit status: Patient canceled the appointment        [facility] 75  coding opportunities             Chart Reviewed * (Number of) Inbasket suggestions sent to Provider: 1     I11 0 Hypertensive heart disease with heart failure ([place] [address] )  * Code also type of heart failure     If this is correct, please document and assess at your next visit 8/31/21                 Patients insurance company: [address]  (Medicare Advantage and Commercial)
88

## 2022-03-16 NOTE — PROGRESS NOTE ADULT - SUBJECTIVE AND OBJECTIVE BOX
UROLOGY PROGRESS NOTE:    Pt is a 45 yo F POD#1 s/p cysto R ureteral stent placement and attempted L ureteral stent placement, now POD#0 s/p L PCN placement by IR with approx 10cc purulent drainage aspirated. Pt seen and examined at bedside, resting comfortably in bed. Denies fevers, N/V, abdominal pain.     PAST MEDICAL & SURGICAL HISTORY:  Stroke  Status post craniectomy    MEDICATIONS  (STANDING):  cefepime   IVPB 1000 milliGRAM(s) IV Intermittent every 8 hours  influenza   Vaccine 0.5 milliLiter(s) IntraMuscular once  lactated ringers. 1000 milliLiter(s) (100 mL/Hr) IV Continuous <Continuous>  pantoprazole    Tablet 40 milliGRAM(s) Oral before breakfast  senna 2 Tablet(s) Oral at bedtime    MEDICATIONS  (PRN):  acetaminophen     Tablet .. 650 milliGRAM(s) Oral every 6 hours PRN Mild Pain (1 - 3)  oxyCODONE    IR 5 milliGRAM(s) Oral every 6 hours PRN Moderate Pain (4 - 6)    Allergies  No Known Allergies    SOCIAL HISTORY: No illicit drug use    FAMILY HISTORY:    Review of Systems:  [X] A ten point review of systems was otherwise negative except as noted.    Vital Signs Last 24 Hrs  T(C): 36.5 (16 Mar 2022 08:01), Max: 37.8 (15 Mar 2022 19:18)  T(F): 97.7 (16 Mar 2022 07:00), Max: 100.1 (15 Mar 2022 19:18)  HR: 76 (16 Mar 2022 10:42) (62 - 101)  BP: 127/63 (16 Mar 2022 10:42) (107/72 - 153/84)  BP(mean): 90 (16 Mar 2022 10:42) (88 - 90)  RR: 20 (16 Mar 2022 10:42) (15 - 23)  SpO2: 97% (16 Mar 2022 10:42) (93% - 99%)    PHYSICAL EXAM:  GEN: NAD  NEURO: Awake and alert   SKIN: Good color, non diaphoretic  HEENT: NC/AT  RESP: Non-labored breathing  CARDIO: +S1/S2  ABDO: Soft, ND   BACK: +L nephrostomy with blood tinged urine in bag (approx 200cc), no clots ; overlying dressing C/D/I.     I&O's Summary    15 Mar 2022 07:01  -  16 Mar 2022 07:00  --------------------------------------------------------  IN: 100 mL / OUT: 675 mL / NET: -575 mL    16 Mar 2022 07:01  -  16 Mar 2022 11:26  --------------------------------------------------------  IN: 400 mL / OUT: 0 mL / NET: 400 mL      LABS:                        12.0   21.70 )-----------( 302      ( 16 Mar 2022 05:50 )             35.6     03-16    136  |  101  |  12  ----------------------------<  153<H>  4.5   |  25  |  0.9    Ca    8.7      16 Mar 2022 05:50  Phos  3.6     03-16  Mg     1.8     03-16    TPro  7.1  /  Alb  4.7  /  TBili  0.3  /  DBili  x   /  AST  17  /  ALT  20  /  AlkPhos  85  03-15    PT/INR - ( 16 Mar 2022 05:50 )   PT: 13.30 sec;   INR: 1.16 ratio         PTT - ( 16 Mar 2022 05:50 )  PTT:31.0 sec  Urinalysis Basic - ( 15 Mar 2022 16:20 )  Color: Yellow / Appearance: Cloudy / SG: >=1.030 / pH: x  Gluc: x / Ketone: Negative  / Bili: Negative / Urobili: 0.2 mg/dL   Blood: x / Protein: >=300 mg/dL / Nitrite: Positive   Leuk Esterase: Moderate / RBC: >50 /HPF / WBC >50 /HPF   Sq Epi: x / Non Sq Epi: x / Bacteria: Many       UROLOGY PROGRESS NOTE:    Pt is a 47 yo F POD#1 s/p cysto R ureteral stent placement and attempted L ureteral stent placement, now POD#0 s/p L PCN placement by IR with approx 10cc purulent fluid aspirated. Pt seen and examined at bedside, resting comfortably in bed. Denies fevers, N/V, abdominal pain.     PAST MEDICAL & SURGICAL HISTORY:  Stroke  Status post craniectomy    MEDICATIONS  (STANDING):  cefepime   IVPB 1000 milliGRAM(s) IV Intermittent every 8 hours  influenza   Vaccine 0.5 milliLiter(s) IntraMuscular once  lactated ringers. 1000 milliLiter(s) (100 mL/Hr) IV Continuous <Continuous>  pantoprazole    Tablet 40 milliGRAM(s) Oral before breakfast  senna 2 Tablet(s) Oral at bedtime    MEDICATIONS  (PRN):  acetaminophen     Tablet .. 650 milliGRAM(s) Oral every 6 hours PRN Mild Pain (1 - 3)  oxyCODONE    IR 5 milliGRAM(s) Oral every 6 hours PRN Moderate Pain (4 - 6)    Allergies  No Known Allergies    SOCIAL HISTORY: No illicit drug use    FAMILY HISTORY:    Review of Systems:  [X] A ten point review of systems was otherwise negative except as noted.    Vital Signs Last 24 Hrs  T(C): 36.5 (16 Mar 2022 08:01), Max: 37.8 (15 Mar 2022 19:18)  T(F): 97.7 (16 Mar 2022 07:00), Max: 100.1 (15 Mar 2022 19:18)  HR: 76 (16 Mar 2022 10:42) (62 - 101)  BP: 127/63 (16 Mar 2022 10:42) (107/72 - 153/84)  BP(mean): 90 (16 Mar 2022 10:42) (88 - 90)  RR: 20 (16 Mar 2022 10:42) (15 - 23)  SpO2: 97% (16 Mar 2022 10:42) (93% - 99%)    PHYSICAL EXAM:  GEN: NAD  NEURO: Awake and alert   SKIN: Good color, non diaphoretic  HEENT: NC/AT  RESP: Non-labored breathing  CARDIO: +S1/S2  ABDO: Soft, ND   BACK: +L nephrostomy with blood tinged urine in bag (approx 200cc), no clots ; overlying dressing C/D/I.     I&O's Summary    15 Mar 2022 07:01  -  16 Mar 2022 07:00  --------------------------------------------------------  IN: 100 mL / OUT: 675 mL / NET: -575 mL    16 Mar 2022 07:01  -  16 Mar 2022 11:26  --------------------------------------------------------  IN: 400 mL / OUT: 0 mL / NET: 400 mL      LABS:                        12.0   21.70 )-----------( 302      ( 16 Mar 2022 05:50 )             35.6     03-16    136  |  101  |  12  ----------------------------<  153<H>  4.5   |  25  |  0.9    Ca    8.7      16 Mar 2022 05:50  Phos  3.6     03-16  Mg     1.8     03-16    TPro  7.1  /  Alb  4.7  /  TBili  0.3  /  DBili  x   /  AST  17  /  ALT  20  /  AlkPhos  85  03-15    PT/INR - ( 16 Mar 2022 05:50 )   PT: 13.30 sec;   INR: 1.16 ratio         PTT - ( 16 Mar 2022 05:50 )  PTT:31.0 sec  Urinalysis Basic - ( 15 Mar 2022 16:20 )  Color: Yellow / Appearance: Cloudy / SG: >=1.030 / pH: x  Gluc: x / Ketone: Negative  / Bili: Negative / Urobili: 0.2 mg/dL   Blood: x / Protein: >=300 mg/dL / Nitrite: Positive   Leuk Esterase: Moderate / RBC: >50 /HPF / WBC >50 /HPF   Sq Epi: x / Non Sq Epi: x / Bacteria: Many    RADIOLOGY:  Placement of 8F left nephrostomy catheter. Approximately 10cc purulent fluid aspirated and sent for laboratory analysis.

## 2022-03-16 NOTE — PRE-ANESTHESIA EVALUATION ADULT - NSANTHOSAYNRD_GEN_A_CORE
No. LUIS screening performed.  STOP BANG Legend: 0-2 = LOW Risk; 3-4 = INTERMEDIATE Risk; 5-8 = HIGH Risk
No. LUIS screening performed.  STOP BANG Legend: 0-2 = LOW Risk; 3-4 = INTERMEDIATE Risk; 5-8 = HIGH Risk

## 2022-03-16 NOTE — CONSULT NOTE ADULT - ATTENDING COMMENTS
47 y/o female with UTI.  Bilateral Pyelonephritis.  S/P Cystoscopy and Right Ureteral Stent Placement.  Left Hydronephrosis.  At risk for hemodynamic instability.  History of CVA.  Acute postoperative pain.    PLAN:  - pain control  - continuous O2sat and cardiac monitoring  - keep MAP>65  - follow serum electrolytes and UOP  - on Cefepime; follow cultures  - GI and DVT prophylaxis

## 2022-03-16 NOTE — PROCEDURE NOTE - PROCEDURE FINDINGS AND DETAILS
Placement of 8F left nephrostomy catheter. Approximately 10cc purulent fluid aspirated and sent for laboratory analysis.

## 2022-03-16 NOTE — PROCEDURE NOTE - PLAN
- Continued close clinical monitoring with attention to post-procedure sepsis and bleeding.   - Trend drain output.   - Follow up cultures.

## 2022-03-17 LAB
ANION GAP SERPL CALC-SCNC: 10 MMOL/L — SIGNIFICANT CHANGE UP (ref 7–14)
BUN SERPL-MCNC: 16 MG/DL — SIGNIFICANT CHANGE UP (ref 10–20)
CALCIUM SERPL-MCNC: 8.5 MG/DL — SIGNIFICANT CHANGE UP (ref 8.5–10.1)
CHLORIDE SERPL-SCNC: 106 MMOL/L — SIGNIFICANT CHANGE UP (ref 98–110)
CO2 SERPL-SCNC: 25 MMOL/L — SIGNIFICANT CHANGE UP (ref 17–32)
CREAT SERPL-MCNC: 0.9 MG/DL — SIGNIFICANT CHANGE UP (ref 0.7–1.5)
EGFR: 80 ML/MIN/1.73M2 — SIGNIFICANT CHANGE UP
GLUCOSE SERPL-MCNC: 98 MG/DL — SIGNIFICANT CHANGE UP (ref 70–99)
HCT VFR BLD CALC: 31 % — LOW (ref 37–47)
HGB BLD-MCNC: 10.4 G/DL — LOW (ref 12–16)
MCHC RBC-ENTMCNC: 30.3 PG — SIGNIFICANT CHANGE UP (ref 27–31)
MCHC RBC-ENTMCNC: 33.5 G/DL — SIGNIFICANT CHANGE UP (ref 32–37)
MCV RBC AUTO: 90.4 FL — SIGNIFICANT CHANGE UP (ref 81–99)
NRBC # BLD: 0 /100 WBCS — SIGNIFICANT CHANGE UP (ref 0–0)
PLATELET # BLD AUTO: 257 K/UL — SIGNIFICANT CHANGE UP (ref 130–400)
POTASSIUM SERPL-MCNC: 4.3 MMOL/L — SIGNIFICANT CHANGE UP (ref 3.5–5)
POTASSIUM SERPL-SCNC: 4.3 MMOL/L — SIGNIFICANT CHANGE UP (ref 3.5–5)
RBC # BLD: 3.43 M/UL — LOW (ref 4.2–5.4)
RBC # FLD: 12.8 % — SIGNIFICANT CHANGE UP (ref 11.5–14.5)
SODIUM SERPL-SCNC: 141 MMOL/L — SIGNIFICANT CHANGE UP (ref 135–146)
WBC # BLD: 19.92 K/UL — HIGH (ref 4.8–10.8)
WBC # FLD AUTO: 19.92 K/UL — HIGH (ref 4.8–10.8)

## 2022-03-17 RX ORDER — CLOPIDOGREL BISULFATE 75 MG/1
75 TABLET, FILM COATED ORAL DAILY
Refills: 0 | Status: DISCONTINUED | OUTPATIENT
Start: 2022-03-17 | End: 2022-03-18

## 2022-03-17 RX ORDER — SPIRONOLACTONE 25 MG/1
50 TABLET, FILM COATED ORAL DAILY
Refills: 0 | Status: DISCONTINUED | OUTPATIENT
Start: 2022-03-17 | End: 2022-03-18

## 2022-03-17 RX ORDER — ASPIRIN/CALCIUM CARB/MAGNESIUM 324 MG
325 TABLET ORAL DAILY
Refills: 0 | Status: DISCONTINUED | OUTPATIENT
Start: 2022-03-17 | End: 2022-03-18

## 2022-03-17 RX ADMIN — CLOPIDOGREL BISULFATE 75 MILLIGRAM(S): 75 TABLET, FILM COATED ORAL at 14:49

## 2022-03-17 RX ADMIN — SODIUM CHLORIDE 100 MILLILITER(S): 9 INJECTION, SOLUTION INTRAVENOUS at 00:28

## 2022-03-17 RX ADMIN — Medication 325 MILLIGRAM(S): at 14:49

## 2022-03-17 RX ADMIN — PANTOPRAZOLE SODIUM 40 MILLIGRAM(S): 20 TABLET, DELAYED RELEASE ORAL at 05:59

## 2022-03-17 RX ADMIN — SIMVASTATIN 20 MILLIGRAM(S): 20 TABLET, FILM COATED ORAL at 21:36

## 2022-03-17 RX ADMIN — OXYCODONE HYDROCHLORIDE 5 MILLIGRAM(S): 5 TABLET ORAL at 21:34

## 2022-03-17 RX ADMIN — ENOXAPARIN SODIUM 40 MILLIGRAM(S): 100 INJECTION SUBCUTANEOUS at 17:55

## 2022-03-17 RX ADMIN — OXYCODONE HYDROCHLORIDE 5 MILLIGRAM(S): 5 TABLET ORAL at 10:38

## 2022-03-17 RX ADMIN — SENNA PLUS 2 TABLET(S): 8.6 TABLET ORAL at 21:35

## 2022-03-17 RX ADMIN — CEFTRIAXONE 100 MILLIGRAM(S): 500 INJECTION, POWDER, FOR SOLUTION INTRAMUSCULAR; INTRAVENOUS at 05:58

## 2022-03-17 RX ADMIN — OXYCODONE HYDROCHLORIDE 5 MILLIGRAM(S): 5 TABLET ORAL at 00:27

## 2022-03-17 NOTE — CONSULT NOTE ADULT - SUBJECTIVE AND OBJECTIVE BOX
INTERVENTIONAL RADIOLOGY CONSULT:       HPI:  47 YO f WITH PMHX OF CVA 13 years ago due to OCP'S required crainotomy on ASA/ PLAVIX/ HLD on statin , p/w left flank pain x 1 day 10/10 sharp non-radiating.  pt states visiting PMD- dr. jones last week and being treated for UTI but pain arised today and was sent to ER to eval for stone.  Pt vomited in PMD office. + fever in ER 99.5   denies sob/ cp / chills / rigors   denies frequent UTI   does not have a urologist     ER: IVF / IVABX  (15 Mar 2022 18:19)      PAST MEDICAL & SURGICAL HISTORY:  Stroke    Status post craniectomy        MEDICATIONS  (STANDING):  cefTRIAXone   IVPB 1000 milliGRAM(s) IV Intermittent every 24 hours  lactated ringers. 1000 milliLiter(s) (75 mL/Hr) IV Continuous <Continuous>  sodium chloride 0.9%. 1000 milliLiter(s) (100 mL/Hr) IV Continuous <Continuous>    MEDICATIONS  (PRN):  HYDROmorphone  Injectable 0.5 milliGRAM(s) IV Push every 10 minutes PRN Moderate Pain (4 - 6)  morphine  - Injectable 2 milliGRAM(s) IV Push every 4 hours PRN Moderate Pain (4 - 6)      Allergies    No Known Allergies    Intolerances        FAMILY HISTORY:      Physical Exam:   Vital Signs Last 24 Hrs  T(C): 37.3 (15 Mar 2022 20:56), Max: 37.8 (15 Mar 2022 19:18)  T(F): 99.2 (15 Mar 2022 20:56), Max: 100.1 (15 Mar 2022 19:18)  HR: 87 (15 Mar 2022 21:11) (80 - 101)  BP: 124/78 (15 Mar 2022 21:11) (107/72 - 153/84)  BP(mean): --  RR: 20 (15 Mar 2022 21:11) (18 - 23)  SpO2: 97% (15 Mar 2022 21:11) (96% - 99%)      Labs:                         13.2   19.84 )-----------( 324      ( 15 Mar 2022 16:20 )             38.8     03-15    140  |  101  |  18  ----------------------------<  99  4.6   |  27  |  1.1    Ca    10.0      15 Mar 2022 16:20    TPro  7.1  /  Alb  4.7  /  TBili  0.3  /  DBili  x   /  AST  17  /  ALT  20  /  AlkPhos  85  03-15        Pertinent labs:                      13.2   19.84 )-----------( 324      ( 15 Mar 2022 16:20 )             38.8       03-15    140  |  101  |  18  ----------------------------<  99  4.6   |  27  |  1.1    Ca    10.0      15 Mar 2022 16:20    TPro  7.1  /  Alb  4.7  /  TBili  0.3  /  DBili  x   /  AST  17  /  ALT  20  /  AlkPhos  85  03-15          Radiology & Additional Studies:     Radiology imaging reviewed.       ASSESSMENT AND PLAN:  46F with obstructing left ureteral calculus and moderate hydronephrosis. Tachycardic to 108, normotensive. Pyonephrosis on ureteroscopy but renal pelvis not able to be accessed. Case discussed with Dr. Romano.   - Transfer to [facility].   - Close hemodynamic monitoring, antimicrobial therapy, and fluid resuscitation.   - STAT COAGS.   - NPO.   - No AC. Hold patient's home aspirin and plavix.   - First case AM.    Please call Interventional Radiology with questions or concerns:   - M-F [phone]: [phone]   - All other hours: [phone]  
  SICU Consultation Note  =====================================================  HPI: 46y Female w/ PMH CVA 8 years ago (2013) d/t OCPusage requiring craniotomy on ASA/Plavix, HLD, and former smoker presents s/p Cystoscopy and right ureteral stent placement with inability to place left ureteral stent. Patient presented to the [facility] ED with 1 day of 10/10 flank pain that was sharp and not radiating. Patient saw PMD Dr. Telles the week before and was being treated for a UTI with Macrobid. When nestor arised patient was sent to ED for eval for stone. Patient was febrile. CT scan showed bilateral obstructing stones with hydronephrosis on the Left. Urology was consulted and patient was taken to the OR for cystoscopy and ureteral stent placement. In the OR a right ureteral stent was sucessfully placed. In attempt to place the left stent significant pyelonephritis was seen, but the stent was unable to be placed. IR was then consulted for Perc Nephrostomy. SICU consulted for hemodynamic monitoring with concern for urosepsis and hemodynamic instability.     Surgery Information  OR time:      EBL:          IV Fluids:       Blood Products:   UOP:          PAST MEDICAL & SURGICAL HISTORY:  Stroke    Status post craniectomy    HLD    Former smoker- quit 1999      Home Meds: Home Medications:  aspirin 325 mg oral tablet: orally once a day (15 Mar 2022 16:11)  Multiple Vitamins oral tablet: 1 tab(s) orally once a day (15 Mar 2022 16:11)  Plavix 75 mg oral tablet: 1 tab(s) orally once a day (15 Mar 2022 16:11)  Probiotic Formula oral capsule: 1 cap(s) orally once a day (15 Mar 2022 16:11)  simvastatin 20 mg oral tablet: 1 tab(s) orally once a day (at bedtime) (15 Mar 2022 16:11)  Zinc:  (15 Mar 2022 16:11)    Allergies: Allergies    No Known Allergies    Intolerances      Soc:   Advanced Directives: Presumed Full Code     ROS:    REVIEW OF SYSTEMS    [x] A ten-point review of systems was otherwise negative except as noted.  [ ] Due to altered mental status/intubation, subjective information were not able to be obtained from the patient. History was obtained, to the extent possible, from review of the chart and collateral sources of information.      CURRENT MEDICATIONS:   --------------------------------------------------------------------------------------  Neurologic Medications  acetaminophen     Tablet .. 650 milliGRAM(s) Oral every 6 hours PRN Mild Pain (1 - 3)    Respiratory Medications    Cardiovascular Medications    Gastrointestinal Medications  lactated ringers. 1000 milliLiter(s) IV Continuous <Continuous>  pantoprazole    Tablet 40 milliGRAM(s) Oral before breakfast  senna 2 Tablet(s) Oral at bedtime    Genitourinary Medications    Hematologic/Oncologic Medications  influenza   Vaccine 0.5 milliLiter(s) IntraMuscular once    Antimicrobial/Immunologic Medications  cefepime   IVPB 1000 milliGRAM(s) IV Intermittent every 8 hours    Endocrine/Metabolic Medications    Topical/Other Medications    --------------------------------------------------------------------------------------    VITAL SIGNS, INS/OUTS (last 24 hours):  --------------------------------------------------------------------------------------  ICU Vital Signs Last 24 Hrs  T(C): 36.7 (16 Mar 2022 01:00), Max: 37.8 (15 Mar 2022 19:18)  T(F): 98 (16 Mar 2022 01:00), Max: 100.1 (15 Mar 2022 19:18)  HR: 81 (16 Mar 2022 01:00) (80 - 101)  BP: 131/79 (16 Mar 2022 01:00) (107/72 - 153/84)  BP(mean): --  ABP: --  ABP(mean): --  RR: 17 (16 Mar 2022 01:00) (15 - 23)  SpO2: 98% (16 Mar 2022 01:00) (93% - 99%)    I&O's Summary    15 Mar 2022 07:01  -  16 Mar 2022 02:30  --------------------------------------------------------  IN: 0 mL / OUT: 375 mL / NET: -375 mL      --------------------------------------------------------------------------------------    EXAM:  General/Neuro  Exam: Normal, NAD, alert, oriented x 3, LUE weakness, contracted Left hand. RUE strength intact. DHILLON    Respiratory  Exam: Lungs clear to auscultation, Normal expansion/effort.     Cardiovascular  Exam: S1, S2.  Regular rate and rhythm. No Peripheral edema   Cardiac Rhythm: Normal Sinus Rhythm    GI  Exam: Abdomen soft, Non-tender, Non-distended.        Extremities  Exam: Extremities warm, pink, well-perfused.        Derm:  Exam: Good skin turgor, no skin breakdown.      :   Exam: No Ragland catheter in place.     Tubes/Lines/Drains  ***  [x] Peripheral IV  [] Central Venous Line     	[] R	[] L	[] IJ	[] Fem	[] SC        Type:	    Date Placed:   [] Arterial Line		[] R	[] L	[] Fem	[] Rad	[] Ax	Date Placed:   [] PICC:         	[] Midline		[] Mediport           [] Urinary Catheter		Date Placed:       LABS  --------------------------------------------------------------------------------------  Labs:  CAPILLARY BLOOD GLUCOSE                              13.2   19.84 )-----------( 324      ( 15 Mar 2022 16:20 )             38.8       Auto Neutrophil %: 86.9 % (03-15-22 @ 16:20)  Auto Immature Granulocyte %: 1.1 % (03-15-22 @ 16:20)    03-15    140  |  101  |  18  ----------------------------<  99  4.6   |  27  |  1.1      Calcium, Total Serum: 10.0 mg/dL (03-15-22 @ 16:20)      LFTs:             7.1  | 0.3  | 17       ------------------[85      ( 15 Mar 2022 16:20 )  4.7  | x    | 20          Lipase:97     Amylase:x         Lactate, Blood: 1.1 mmol/L (03-15-22 @ 16:20)      Coags:            Urinalysis Basic - ( 15 Mar 2022 16:20 )    Color: Yellow / Appearance: Cloudy / SG: >=1.030 / pH: x  Gluc: x / Ketone: Negative  / Bili: Negative / Urobili: 0.2 mg/dL   Blood: x / Protein: >=300 mg/dL / Nitrite: Positive   Leuk Esterase: Moderate / RBC: >50 /HPF / WBC >50 /HPF   Sq Epi: x / Non Sq Epi: x / Bacteria: Many          --------------------------------------------------------------------------------------    OTHER LABS    IMAGING RESULTS    < from: CT Abdomen and Pelvis w/ IV Cont (03.15.22 @ 17:21) >  IMPRESSION:  1.  Moderate left hydroureteronephrosis secondary to a 0.7 x 0.5 x 0.9 cm   distal left ureteral calculus (); diffuse left-sided urothelial   thickening/hyperenhancement and surrounding inflammation. Findings may   represent superimposed ascending urinary tract infection. Correlate with   urinalysis and other laboratory findings.    2.  Right UPJ/proximal ureteral 0.8 x 0.5 x 0.9 cm calculus without   associated hydronephrosis.    < end of copied text >    
  YI CONKLIN  46y, Female  Allergy: No Known Allergies      All historical available data reviewed.    HPI:  47 YO f WITH PMHX OF CVA 13 years ago due to OCP'S required crainotomy on ASA/ PLAVIX/ HLD on statin , p/w left flank pain x 1 day 10/10 sharp non-radiating.  pt states visiting PMD- dr. jones last week and being treated for UTI but pain arised today and was sent to ER to eval for stone.  Pt vomited in PMD office. + fever in ER 99.5   denies sob/ cp / chills / rigors     3/15 CT Abdomen and Pelvis w/ IV Cont (03.15.22 @ 17:21) >  1.  Moderate left hydroureteronephrosis secondary to a 0.7 x 0.5 x 0.9 cm   distal left ureteral calculus (); diffuse left-sided urothelial   thickening/hyperenhancement and surrounding inflammation. Findings may   represent superimposed ascending urinary tract infection. Correlate with   urinalysis and other laboratory findings.  2.  Right UPJ/proximal ureteral 0.8 x 0.5 x 0.9 cm calculus without   associated hydronephrosis.    3/16 IVR S/p L PCN      FAMILY HISTORY:    PAST MEDICAL & SURGICAL HISTORY:  Stroke    Status post craniectomy          VITALS:  T(F): 97.3, Max: 98.2 (03-16-22 @ 20:00)  HR: 72  BP: 108/57  RR: 18Vital Signs Last 24 Hrs  T(C): 36.3 (17 Mar 2022 07:45), Max: 36.8 (16 Mar 2022 20:00)  T(F): 97.3 (17 Mar 2022 07:45), Max: 98.2 (16 Mar 2022 20:00)  HR: 72 (17 Mar 2022 07:45) (67 - 87)  BP: 108/57 (17 Mar 2022 07:45) (99/56 - 127/63)  BP(mean): 84 (16 Mar 2022 20:00) (84 - 90)  RR: 18 (17 Mar 2022 07:45) (18 - 20)  SpO2: 95% (17 Mar 2022 00:00) (95% - 97%)    TESTS & MEASUREMENTS:                        10.4   19.92 )-----------( 257      ( 17 Mar 2022 07:26 )             31.0     03-17    141  |  106  |  16  ----------------------------<  98  4.3   |  25  |  0.9    Ca    8.5      17 Mar 2022 07:26  Phos  2.6     03-16  Mg     2.0     03-16    TPro  7.1  /  Alb  4.7  /  TBili  0.3  /  DBili  x   /  AST  17  /  ALT  20  /  AlkPhos  85  03-15    LIVER FUNCTIONS - ( 15 Mar 2022 16:20 )  Alb: 4.7 g/dL / Pro: 7.1 g/dL / ALK PHOS: 85 U/L / ALT: 20 U/L / AST: 17 U/L / GGT: x             Culture - Blood (collected 03-15-22 @ 18:00)  Source: .Blood Blood  Gram Stain (03-16-22 @ 12:21):    Growth in aerobic bottle: Gram Negative Rods  Preliminary Report (03-16-22 @ 12:22):    Growth in aerobic bottle: Gram Negative Rods    ***Blood Panel PCR results on this specimen are available    approximately 3 hours after the Gram stain result.***    Gram stain, PCR, and/or culture results may not always    correspond due to difference in methodologies.    ************************************************************    This PCR assay was performed by multiplex PCR. This    Assay tests for 66 bacterial and resistance gene targets.    Please refer to the [facility] test directory    at [email or web address] for details.  Organism: Blood Culture PCR (03-16-22 @ 14:08)  Organism: Blood Culture PCR (03-16-22 @ 14:08)      -  Escherichia coli: Detec      Method Type: PCR    Culture - Blood (collected 03-15-22 @ 18:00)  Source: .Blood Blood  Gram Stain (03-16-22 @ 13:47):    Growth in anaerobic bottle: Gram Negative Rods  Preliminary Report (03-16-22 @ 13:47):    Growth in anaerobic bottle: Gram Negative Rods      Urinalysis Basic - ( 15 Mar 2022 16:20 )    Color: Yellow / Appearance: Cloudy / SG: >=1.030 / pH: x  Gluc: x / Ketone: Negative  / Bili: Negative / Urobili: 0.2 mg/dL   Blood: x / Protein: >=300 mg/dL / Nitrite: Positive   Leuk Esterase: Moderate / RBC: >50 /HPF / WBC >50 /HPF   Sq Epi: x / Non Sq Epi: x / Bacteria: Many          RADIOLOGY & ADDITIONAL TESTS:  Personal review of radiological diagnostics performed  Echo and EKG results noted when applicable.     MEDICATIONS:  acetaminophen     Tablet .. 650 milliGRAM(s) Oral every 6 hours PRN  aspirin 325 milliGRAM(s) Oral daily  cefTRIAXone   IVPB 2000 milliGRAM(s) IV Intermittent every 12 hours  clopidogrel Tablet 75 milliGRAM(s) Oral daily  enoxaparin Injectable 40 milliGRAM(s) SubCutaneous every 24 hours  influenza   Vaccine 0.5 milliLiter(s) IntraMuscular once  lactated ringers. 1000 milliLiter(s) IV Continuous <Continuous>  oxyCODONE    IR 5 milliGRAM(s) Oral every 6 hours PRN  pantoprazole    Tablet 40 milliGRAM(s) Oral before breakfast  senna 2 Tablet(s) Oral at bedtime  simvastatin 20 milliGRAM(s) Oral at bedtime  spironolactone 50 milliGRAM(s) Oral daily      ANTIBIOTICS:  cefTRIAXone   IVPB 2000 milliGRAM(s) IV Intermittent every 12 hours

## 2022-03-17 NOTE — CONSULT NOTE ADULT - ASSESSMENT
Assessment & Plan    46y Female s/p cystoscopy and Right ureteral stent placement, with Left obstructing stone and pyelonephritis    NEURO:  Hx CVA s/p Craniotomy in 2013- residual Left sided weakness  Acute pain-controlled with Tylenol    RESP:   Former smoker  - Encourage IS  - AM CXR  - Saturating well on RA        CARDS:   Hx HLD  - Continue home Statin  - f/u EKG  - Monitor BP      GI/NUTR:   - Diet, NPO for procedure  - GI Prophylaxis- PPI  - Bowel regimen- Senna      /RENAL:   b/l Obstructing stones with pyelonephritis  s/p Right ureteral stent  - pending Left perc nephrostomy in AM with IR  - Monitor UO  - Voiding  - LR @100     Labs:          BUN/Cr- 18/1.1  -->          Electrolytes-Na 140 // K 4.6 // Mg -- //  Phos -- (03-15 @ 16:20)      HEME/ONC:   - DVT prophylaxis-Holding for IR procedure, SCDs  - Holding home ASA and Plavix for IR procedure    Labs: Hb/Hct:  13.2/38.8  -->                      Plts:  324  -->                 PTT/INR:        Home Rx: Plavix 75 mg oral tablet: 1 tab(s) orally once a day        ID:  WBC- 19.84  --->>  Temp trend- 24hrs T(F): 98 (03-16 @ 01:00), Max: 100.1 (03-15 @ 19:18)  Antibiotics-cefepime   IVPB 1000 every 8 hours  - f/u BCx           ENDO:  No chronic Hx  - Glucose Glucose, Serum: 99 (03-15 @ 16:20)  - f/u HA1C   - FS q6 while NPO    LINES/DRAINS:  PIV    DISPO:    SICU    Discussed with Dr. Jensen
45 YO f WITH PMHX OF CVA 13 years ago due to OCP'S required crainotomy on ASA/ PLAVIX/ HLD on statin , p/w left flank pain x 1 day 10/10 sharp non-radiating.  pt states visiting PMD- dr. jones last week and being treated for UTI but pain arised today and was sent to ER to eval for stone.  Pt vomited in PMD office. + fever in ER 99.5   denies sob/ cp / chills / rigors     3/15 CT Abdomen and Pelvis w/ IV Cont (03.15.22 @ 17:21) >  1.  Moderate left hydroureteronephrosis secondary to a 0.7 x 0.5 x 0.9 cm   distal left ureteral calculus (); diffuse left-sided urothelial   thickening/hyperenhancement and surrounding inflammation. Findings may   represent superimposed ascending urinary tract infection. Correlate with   urinalysis and other laboratory findings.  2.  Right UPJ/proximal ureteral 0.8 x 0.5 x 0.9 cm calculus without   associated hydronephrosis.    3/16 IVR S/p L PCN    IMPRESSION;  Acute left pyelonephritis with Moderate left hydroureteronephrosis secondary to a 0.7 x 0.5 x 0.9 cm   distal left ureteral calculus with E coli bacteremia  3/15 BCx E coli  3/17 IVR L PCN     RECOMMENDATIONS;  F/u with   Rocephin 2 gm iv q24h  If no further  intervention planned then change to po Vantin 200 mg q12h on discharge for 14 more days  Please do not hesitate to recall ID if any questions arise either through [email or web address] or through [employer]

## 2022-03-17 NOTE — CONSULT NOTE ADULT - CONSULT REASON
Urosepsis
hemodynamic monitoring for concern for septic shock with pyelonephritis and inability to place stent
UTI

## 2022-03-17 NOTE — PROGRESS NOTE ADULT - SUBJECTIVE AND OBJECTIVE BOX
Progress Note    Subjective Pt seen and examined at bedside   45 y/o Female with b/l obstructing stones s/p right JJ stent and left PCN.  Pt doing much better today, feels better, no acute issues overnight.  Pt requesting spirolactone 50 mg for dermatological condition.    [ x ] a 10 point review of systems was negative except where noted    Vital signs  T(C): , Max: 36.8 (03-16-22 @ 20:00)  HR: 72 (03-17-22 @ 07:45)  BP: 108/57 (03-17-22 @ 07:45)  SpO2: 95% (03-17-22 @ 00:00)    Gen NC/AT in NAD  SKIN warm, dry with good tugor  Neck supple, FROM  LUNGS No dyspnea, No accessory muscle use  BACK No CVAT, nephrostomy draining clear urine 1350 cc  ABD    Soft non tender, bladder not palpable   voiding       Labs                        10.4   19.92 )-----------( 257      ( 17 Mar 2022 07:26 )             31.0     16 Mar 2022 20:00    137    |  101    |  13     ----------------------------<  115    4.8     |  25     |  1.1      Ca    8.5        16 Mar 2022 20:00  Phos  2.6       16 Mar 2022 20:00  Mg     2.0       16 Mar 2022 20:00      Culture - Blood (03.15.22 @ 18:00)   - Escherichia coli: Detec   Gram Stain:   Growth in aerobic bottle: Gram Negative Rods   Specimen Source: .Blood Blood   Organism: Blood Culture PCR                              Progress Note    Subjective Pt seen and examined at bedside   45 y/o Female with b/l obstructing stones s/p right JJ stent and left PCN.  Pt doing much better today, feels better, no acute issues overnight.  Pt requesting spirolactone 50 mg for dermatological condition.    [ x ] a 10 point review of systems was negative except where noted    Vital signs  T(C): , Max: 36.8 (03-16-22 @ 20:00)  HR: 72 (03-17-22 @ 07:45)  BP: 108/57 (03-17-22 @ 07:45)  SpO2: 95% (03-17-22 @ 00:00)    Gen NC/AT in NAD  SKIN warm, dry with good tugor  Neck supple, FROM  LUNGS No dyspnea, No accessory muscle use  BACK No CVAT, nephrostomy draining clear urine 1350 cc  ABD    Soft non tender, bladder not palpable   voiding Left nephrostomy site clean and drainage is clear      Labs                        10.4   19.92 )-----------( 257      ( 17 Mar 2022 07:26 )             31.0     16 Mar 2022 20:00    137    |  101    |  13     ----------------------------<  115    4.8     |  25     |  1.1      Ca    8.5        16 Mar 2022 20:00  Phos  2.6       16 Mar 2022 20:00  Mg     2.0       16 Mar 2022 20:00      Culture - Blood (03.15.22 @ 18:00)   - Escherichia coli: Detec   Gram Stain:   Growth in aerobic bottle: Gram Negative Rods   Specimen Source: .Blood Blood   Organism: Blood Culture PCR

## 2022-03-18 VITALS
HEART RATE: 75 BPM | RESPIRATION RATE: 20 BRPM | TEMPERATURE: 99 F | SYSTOLIC BLOOD PRESSURE: 108 MMHG | DIASTOLIC BLOOD PRESSURE: 60 MMHG

## 2022-03-18 LAB
-  AMIKACIN: SIGNIFICANT CHANGE UP
-  AMIKACIN: SIGNIFICANT CHANGE UP
-  AMOXICILLIN/CLAVULANIC ACID: SIGNIFICANT CHANGE UP
-  AMPICILLIN/SULBACTAM: SIGNIFICANT CHANGE UP
-  AMPICILLIN/SULBACTAM: SIGNIFICANT CHANGE UP
-  AMPICILLIN: SIGNIFICANT CHANGE UP
-  AMPICILLIN: SIGNIFICANT CHANGE UP
-  AZTREONAM: SIGNIFICANT CHANGE UP
-  AZTREONAM: SIGNIFICANT CHANGE UP
-  CEFAZOLIN: SIGNIFICANT CHANGE UP
-  CEFAZOLIN: SIGNIFICANT CHANGE UP
-  CEFEPIME: SIGNIFICANT CHANGE UP
-  CEFEPIME: SIGNIFICANT CHANGE UP
-  CEFOXITIN: SIGNIFICANT CHANGE UP
-  CEFOXITIN: SIGNIFICANT CHANGE UP
-  CEFTRIAXONE: SIGNIFICANT CHANGE UP
-  CEFTRIAXONE: SIGNIFICANT CHANGE UP
-  CIPROFLOXACIN: SIGNIFICANT CHANGE UP
-  CIPROFLOXACIN: SIGNIFICANT CHANGE UP
-  ERTAPENEM: SIGNIFICANT CHANGE UP
-  ERTAPENEM: SIGNIFICANT CHANGE UP
-  GENTAMICIN: SIGNIFICANT CHANGE UP
-  GENTAMICIN: SIGNIFICANT CHANGE UP
-  IMIPENEM: SIGNIFICANT CHANGE UP
-  IMIPENEM: SIGNIFICANT CHANGE UP
-  LEVOFLOXACIN: SIGNIFICANT CHANGE UP
-  LEVOFLOXACIN: SIGNIFICANT CHANGE UP
-  MEROPENEM: SIGNIFICANT CHANGE UP
-  MEROPENEM: SIGNIFICANT CHANGE UP
-  NITROFURANTOIN: SIGNIFICANT CHANGE UP
-  PIPERACILLIN/TAZOBACTAM: SIGNIFICANT CHANGE UP
-  PIPERACILLIN/TAZOBACTAM: SIGNIFICANT CHANGE UP
-  TIGECYCLINE: SIGNIFICANT CHANGE UP
-  TOBRAMYCIN: SIGNIFICANT CHANGE UP
-  TOBRAMYCIN: SIGNIFICANT CHANGE UP
-  TRIMETHOPRIM/SULFAMETHOXAZOLE: SIGNIFICANT CHANGE UP
-  TRIMETHOPRIM/SULFAMETHOXAZOLE: SIGNIFICANT CHANGE UP
ANION GAP SERPL CALC-SCNC: 12 MMOL/L — SIGNIFICANT CHANGE UP (ref 7–14)
BASOPHILS # BLD AUTO: 0.07 K/UL — SIGNIFICANT CHANGE UP (ref 0–0.2)
BASOPHILS NFR BLD AUTO: 0.5 % — SIGNIFICANT CHANGE UP (ref 0–1)
BUN SERPL-MCNC: 14 MG/DL — SIGNIFICANT CHANGE UP (ref 10–20)
CALCIUM SERPL-MCNC: 8.7 MG/DL — SIGNIFICANT CHANGE UP (ref 8.5–10.1)
CHLORIDE SERPL-SCNC: 103 MMOL/L — SIGNIFICANT CHANGE UP (ref 98–110)
CO2 SERPL-SCNC: 25 MMOL/L — SIGNIFICANT CHANGE UP (ref 17–32)
CREAT SERPL-MCNC: 0.8 MG/DL — SIGNIFICANT CHANGE UP (ref 0.7–1.5)
CULTURE RESULTS: SIGNIFICANT CHANGE UP
EGFR: 92 ML/MIN/1.73M2 — SIGNIFICANT CHANGE UP
EOSINOPHIL # BLD AUTO: 0.22 K/UL — SIGNIFICANT CHANGE UP (ref 0–0.7)
EOSINOPHIL NFR BLD AUTO: 1.5 % — SIGNIFICANT CHANGE UP (ref 0–8)
GLUCOSE SERPL-MCNC: 87 MG/DL — SIGNIFICANT CHANGE UP (ref 70–99)
HCT VFR BLD CALC: 35.1 % — LOW (ref 37–47)
HGB BLD-MCNC: 11.7 G/DL — LOW (ref 12–16)
IMM GRANULOCYTES NFR BLD AUTO: 1.2 % — HIGH (ref 0.1–0.3)
LYMPHOCYTES # BLD AUTO: 19.9 % — LOW (ref 20.5–51.1)
LYMPHOCYTES # BLD AUTO: 2.88 K/UL — SIGNIFICANT CHANGE UP (ref 1.2–3.4)
MCHC RBC-ENTMCNC: 30.1 PG — SIGNIFICANT CHANGE UP (ref 27–31)
MCHC RBC-ENTMCNC: 33.3 G/DL — SIGNIFICANT CHANGE UP (ref 32–37)
MCV RBC AUTO: 90.2 FL — SIGNIFICANT CHANGE UP (ref 81–99)
METHOD TYPE: SIGNIFICANT CHANGE UP
METHOD TYPE: SIGNIFICANT CHANGE UP
MONOCYTES # BLD AUTO: 0.9 K/UL — HIGH (ref 0.1–0.6)
MONOCYTES NFR BLD AUTO: 6.2 % — SIGNIFICANT CHANGE UP (ref 1.7–9.3)
NEUTROPHILS # BLD AUTO: 10.2 K/UL — HIGH (ref 1.4–6.5)
NEUTROPHILS NFR BLD AUTO: 70.7 % — SIGNIFICANT CHANGE UP (ref 42.2–75.2)
NRBC # BLD: 0 /100 WBCS — SIGNIFICANT CHANGE UP (ref 0–0)
ORGANISM # SPEC MICROSCOPIC CNT: SIGNIFICANT CHANGE UP
PLATELET # BLD AUTO: 295 K/UL — SIGNIFICANT CHANGE UP (ref 130–400)
POTASSIUM SERPL-MCNC: 4.2 MMOL/L — SIGNIFICANT CHANGE UP (ref 3.5–5)
POTASSIUM SERPL-SCNC: 4.2 MMOL/L — SIGNIFICANT CHANGE UP (ref 3.5–5)
RBC # BLD: 3.89 M/UL — LOW (ref 4.2–5.4)
RBC # FLD: 12.8 % — SIGNIFICANT CHANGE UP (ref 11.5–14.5)
SODIUM SERPL-SCNC: 140 MMOL/L — SIGNIFICANT CHANGE UP (ref 135–146)
SPECIMEN SOURCE: SIGNIFICANT CHANGE UP
WBC # BLD: 14.45 K/UL — HIGH (ref 4.8–10.8)
WBC # FLD AUTO: 14.45 K/UL — HIGH (ref 4.8–10.8)

## 2022-03-18 RX ORDER — ACETAMINOPHEN 500 MG
2 TABLET ORAL
Qty: 0 | Refills: 0 | DISCHARGE
Start: 2022-03-18

## 2022-03-18 RX ORDER — CEFPODOXIME PROXETIL 100 MG
1 TABLET ORAL
Qty: 28 | Refills: 0
Start: 2022-03-18 | End: 2022-03-31

## 2022-03-18 RX ADMIN — Medication 325 MILLIGRAM(S): at 11:37

## 2022-03-18 RX ADMIN — PANTOPRAZOLE SODIUM 40 MILLIGRAM(S): 20 TABLET, DELAYED RELEASE ORAL at 05:28

## 2022-03-18 RX ADMIN — CLOPIDOGREL BISULFATE 75 MILLIGRAM(S): 75 TABLET, FILM COATED ORAL at 11:37

## 2022-03-18 RX ADMIN — CEFTRIAXONE 100 MILLIGRAM(S): 500 INJECTION, POWDER, FOR SOLUTION INTRAMUSCULAR; INTRAVENOUS at 05:28

## 2022-03-18 RX ADMIN — SPIRONOLACTONE 50 MILLIGRAM(S): 25 TABLET, FILM COATED ORAL at 05:31

## 2022-03-18 NOTE — DISCHARGE NOTE PROVIDER - CARE PROVIDER_API CALL
Mark Saul)  Urology  [address] [place] [address]  Phone: [phone]  Fax: [phone]  Follow Up Time:

## 2022-03-18 NOTE — DISCHARGE NOTE PROVIDER - NSDCCPCAREPLAN_GEN_ALL_CORE_FT
PRINCIPAL DISCHARGE DIAGNOSIS  Diagnosis: Nephrolithiasis  Assessment and Plan of Treatment:       SECONDARY DISCHARGE DIAGNOSES  Diagnosis: Acute UTI  Assessment and Plan of Treatment:

## 2022-03-18 NOTE — DISCHARGE NOTE NURSING/CASE MANAGEMENT/SOCIAL WORK - NSDCPEFALRISK_GEN_ALL_CORE
For information on Fall & Injury Prevention, visit: [email or web address] OR  [email or web address] OR  https://www.cdc.gov/steadi/patient.html Detail Level: Detailed Size Of Lesion: 1.5cm

## 2022-03-18 NOTE — PROGRESS NOTE ADULT - ATTENDING COMMENTS
Patient with left sided hydronephrosis and sepsis. Failed attempt to cross stone by urology. I personally had a discussion with Dr. Foss who expressed that all urologic options have been exhausted and percutaneous nephrostomy tube placement is her only option. Considering the patient is septic, waiting to perform the procedure after the antiplatelets are cleared is not a reasonable option at this point. The patient demonstrated clear understanding of the increased risk of bleeding secondary to the procedure and recent antiplatelet therapy.
Agree with above
Agree with above  Continue IV antibiotics.    Awaiting sensitivity results  Continue PCNT to drainage.

## 2022-03-18 NOTE — PROGRESS NOTE ADULT - SUBJECTIVE AND OBJECTIVE BOX
UROLOGY: Pt is a 47y/o F s/p cystoscopy, placement of RIGHT JJ stent, s/p IR placement of LEFT PCN due to failed LEFT JJ stent placement POD # 3. Pt seen and examined at bedside. Denies f/n/v/c flank pain or suprapubic pain at this time. Pain is controlled.     [ x ] A 10 Point Review of Systems was negative except where noted    Vital signs  T(C): , Max: 37.5 (03-17-22 @ 16:36)  HR: 75 (03-18-22 @ 08:00)  BP: 108/60 (03-18-22 @ 08:00)    Constitutional: NAD, well-developed  HEENT: EOMI  Neck: no pain  Back: No CVA tenderness  Respiratory: No accessory respiratory muscle use  Abd: Soft, NT/ND  no organomegally  no hernia  :  voiding by self. LEFT PCN draining clear yellow, bladder nonpalpable, normal female genitalia.   Extremities: no edema  Neurological: A/O x 3  Psychiatric: Normal mood, normal affect  Skin: No rashes    Labs             11.7   14.45 )-----------( 295      ( 18 Mar 2022 07:18 )             35.1     140    |  103    |  14     ----------------------------<  87     4.2     |  25     |  0.8      Ca    8.7        18 Mar 2022 07:18  Phos  2.6       16 Mar 2022 20:00  Mg     2.0       16 Mar 2022 20:00

## 2022-03-18 NOTE — PROGRESS NOTE ADULT - REASON FOR ADMISSION
left renal colic

## 2022-03-18 NOTE — DISCHARGE NOTE PROVIDER - HOSPITAL COURSE
Pt is a 45y/o F s/p cystoscopy, placement of right JJ stent, and IR placement of LEFT PCN due to failed LEFT JJ stent placement 2/2 stone burden. She was admitted to the SICU for hemodynamic monitoring. She was downgraded in stable condition. She remained stable on the floor. Her blood thinners were restarted. She was subsequently discharged home in stable condition and instructed to follow up with Dr. Saul for further management of her stone. She was instructed on how to care for her nephrostomy as well.

## 2022-03-18 NOTE — DISCHARGE NOTE PROVIDER - NSDCMRMEDTOKEN_GEN_ALL_CORE_FT
acetaminophen 325 mg oral tablet: 2 tab(s) orally every 6 hours, As needed, Mild Pain (1 - 3)  aspirin 325 mg oral tablet: orally once a day  cefpodoxime 200 mg oral tablet: 1 tab(s) orally 2 times a day MDD:2 TABS  Multiple Vitamins oral tablet: 1 tab(s) orally once a day  Plavix 75 mg oral tablet: 1 tab(s) orally once a day  Probiotic Formula oral capsule: 1 cap(s) orally once a day  simvastatin 20 mg oral tablet: 1 tab(s) orally once a day (at bedtime)  Zinc:

## 2022-03-18 NOTE — PROGRESS NOTE ADULT - ASSESSMENT
45y/o F s/p cystoscopy, placement of RIGHT JJ stent, s/p IR placement of LEFT PCN due to failed LEFT JJ stent placement POD # 3.    - Case d/w Dr. Saul, plan as per attending  - No further inpatient urologic intervention at this time   - D/c home on Vantin PO as per ID x 14 days   - Nephrostomy bag teaching d/w bedside RN  - F/u with Dr. Saul as o/p for further stone mgmt. 
Pt is a 46y Female admitted with right UPJ stone & left distal ureteral stone w/ moderate hydro. Patient s/p cystoscopy with right ureteral stent placement and attempted left ureteral stent placement.    PLAN:  Patient discussed with Dr. Saul, plan as per attending below:  -SICU for hemodynamic monitoring  -IR for left PCN in AM  -Holding home ASA/plavix and anticoagulation per IR  -NPO, IVFs  -F/u AM labs  -C/w cefepime for now  -Monitor vitals  -Monitor urine output  -Pain control prn  -OOBAT  -Incentive spirometry  -SCDs/GI ppx
Pt is a 47 yo F POD#1 s/p cysto R ureteral stent placement and attempted L ureteral stent placement, now POD#0 s/p L PCN placement by IR with approx 10cc purulent drainage aspirated.    ·	Cont with abx; currently on Cefepime   ·	f/u UCx and intra-op cx's; ID c/s   ·	Cont to monitor L PCN output   ·	Trend h/h   ·	Cont with pain control  ·	Advance diet as tolerated   ·	Will need definitive stone management as outpatient   ·	will d/w attng 
45 y/o Female with b/l obstructing stones s/p right JJ stent and left PCN.    A) Stable POD # 2  resolving sepsis  leukocytosis    P) continue abx  awaiting ID input for coverage  spirolactone 50 mg  incentive spirometer  DVT/GI prophylaxix  discharge planning  will d/w attending

## 2022-03-18 NOTE — DISCHARGE NOTE PROVIDER - YES NO FOR MLM POSITIVE OR NEGATIVE COVID RESULT
Subjective: Pt agreeable to therapeutic plan of care.    Objective:     Bed mobility - Min-A  Rolling left to right to left.  Pt needed assist to reach the rail and then pt assisted well.   Transfers - Max-A, Assist x 2 and Dependent  Attempted sit to stand from bs chair 4 times.  Pt unable to lift her bottom off the seat first 3 times but with 4th attempt pt lift slightly and got some weight to her feet.   Pt is a abrahan lift from chair to the bed.   Ambulation -  N/A  Therapeutic exercises - laq's, sitting marches, quad sets, glute sets, ankle pumps.      Pain: 0 VAS  Education: Provided education on importance of mobility and skilled verbal / tactile cueing throughout intervention.     Assessment: Leslie Harris presents with functional mobility impairments which indicate the need for skilled intervention. Tolerating session today without incident. Pt was making good effort with attempts to stand on her feet today but still was unable. Pt does have good potential to cont to recover her mobility with continues physical therapy services.  Will continue to follow and progress as tolerated.     Plan/Recommendations:   Pt would benefit from Inpatient Rehabilitation placement at discharge from facility and requires no DME at discharge.   Pt desires Inpatient Rehabilitation placement at discharge. Pt cooperative; agreeable to therapeutic recommendations and plan of care.     Basic Mobility 6-click:  Rollin = Total, A lot = 2, A little = 3; 4 = None  Supine>Sit:   1 = Total, A lot = 2, A little = 3; 4 = None   Sit>Stand with arms:  1 = Total, A lot = 2, A little = 3; 4 = None  Bed>Chair:   1 = Total, A lot = 2, A little = 3; 4 = None  Ambulate in room:  1 = Total, A lot = 2, A little = 3; 4 = None  3-5 Steps with railin = Total, A lot = 2, A little = 3; 4 = None  Score: 9    Modified [place]: 5 = Severe disability (Requires constant nursing care and attention, bedridden, incontinent)    Post-Tx Position:  Supine with HOB Elevated, Alarms activated and Call light and personal items within reach,  Pt's father present through tx session  PPE: gloves, surgical mask, eyewear protection       ,

## 2022-03-22 DIAGNOSIS — N13.6 PYONEPHROSIS: ICD-10-CM

## 2022-03-22 DIAGNOSIS — I69.354 HEMIPLEGIA AND HEMIPARESIS FOLLOWING CEREBRAL INFARCTION AFFECTING LEFT NON-DOMINANT SIDE: ICD-10-CM

## 2022-03-22 DIAGNOSIS — Z87.891 PERSONAL HISTORY OF NICOTINE DEPENDENCE: ICD-10-CM

## 2022-03-22 DIAGNOSIS — A41.51 SEPSIS DUE TO ESCHERICHIA COLI [E. COLI]: ICD-10-CM

## 2022-03-22 DIAGNOSIS — Z79.82 LONG TERM (CURRENT) USE OF ASPIRIN: ICD-10-CM

## 2022-03-22 DIAGNOSIS — Z79.02 LONG TERM (CURRENT) USE OF ANTITHROMBOTICS/ANTIPLATELETS: ICD-10-CM

## 2024-11-14 NOTE — ED ADULT NURSE NOTE - BREATHING, MLM
Called pt - lvm to call back to r.s bump appt for 12/05/2024, if pt calls back please jonas next available appt for TTE Echo        Appt has been cancelled   Spontaneous, unlabored and symmetrical